# Patient Record
Sex: FEMALE | Race: WHITE | NOT HISPANIC OR LATINO | Employment: OTHER | URBAN - METROPOLITAN AREA
[De-identification: names, ages, dates, MRNs, and addresses within clinical notes are randomized per-mention and may not be internally consistent; named-entity substitution may affect disease eponyms.]

---

## 2019-12-06 ENCOUNTER — HOSPITAL ENCOUNTER (EMERGENCY)
Facility: CLINIC | Age: 79
Discharge: HOME | End: 2019-12-06
Attending: NURSE PRACTITIONER | Admitting: NURSE PRACTITIONER
Payer: MEDICARE

## 2019-12-06 VITALS
HEART RATE: 79 BPM | OXYGEN SATURATION: 97 % | WEIGHT: 132 LBS | TEMPERATURE: 98 F | RESPIRATION RATE: 18 BRPM | BODY MASS INDEX: 22.53 KG/M2 | HEIGHT: 64 IN | DIASTOLIC BLOOD PRESSURE: 74 MMHG | SYSTOLIC BLOOD PRESSURE: 113 MMHG

## 2019-12-06 DIAGNOSIS — M54.42 MIDLINE LOW BACK PAIN WITH LEFT-SIDED SCIATICA: ICD-10-CM

## 2019-12-06 PROCEDURE — 99282 EMERGENCY DEPT VISIT SF MDM: CPT | Performed by: NURSE PRACTITIONER

## 2019-12-06 PROCEDURE — 99284 EMERGENCY DEPT VISIT MOD MDM: CPT | Mod: Z6 | Performed by: NURSE PRACTITIONER

## 2019-12-06 RX ORDER — LIDOCAINE 4 G/G
1-3 PATCH TOPICAL EVERY 24 HOURS
Qty: 30 PATCH | Refills: 0 | Status: SHIPPED | OUTPATIENT
Start: 2019-12-06 | End: 2023-01-01

## 2019-12-06 RX ORDER — DOCUSATE SODIUM 100 MG/1
200 CAPSULE, LIQUID FILLED ORAL 2 TIMES DAILY PRN
Qty: 100 CAPSULE | Refills: 0 | Status: SHIPPED | OUTPATIENT
Start: 2019-12-06

## 2019-12-06 RX ORDER — METHYLPREDNISOLONE 4 MG
TABLET, DOSE PACK ORAL
Qty: 21 TABLET | Refills: 0 | Status: SHIPPED | OUTPATIENT
Start: 2019-12-06 | End: 2023-01-01

## 2019-12-06 RX ORDER — ESTRADIOL 1 MG/1
1 TABLET ORAL DAILY
COMMUNITY
Start: 2018-09-08 | End: 2023-01-01

## 2019-12-06 RX ORDER — SIMVASTATIN 20 MG
20 TABLET ORAL DAILY
COMMUNITY
Start: 2018-09-08 | End: 2021-11-19

## 2019-12-06 RX ORDER — ENALAPRIL MALEATE AND HYDROCHLOROTHIAZIDE 10; 25 MG/1; MG/1
TABLET ORAL DAILY
COMMUNITY
Start: 2018-09-08 | End: 2021-07-15

## 2019-12-06 ASSESSMENT — MIFFLIN-ST. JEOR: SCORE: 1063.75

## 2019-12-06 NOTE — ED NOTES
Patient has chronic low back pain. Has appointment with spine surgeon Wednesday.  Today pain worse.  Low back pain radiating down left buttock to posterior thigh. No numbness or tingling to left foot. No incontinence.  No recent injury.  Started 2 days ago

## 2019-12-06 NOTE — DISCHARGE INSTRUCTIONS
Start the Medrol Dosepak today and take as directed.  You should take this with food to help prevent or minimize gastrointestinal upset.  Apply 3 lidocaine patches where you are having discomfort and leave on for 12 hours.  Then you must remove and leave off for 12 hours.  You may apply 3 no lidocaine patches every single day.  If you only need 1 lidocaine patch you can do this as well.  No more than 3 lidocaine patches should be applied.  For constipation I would like you to take 2 Colace capsules by mouth twice daily until you are having daily soft regular bowel movements  Follow-up with your surgeon on Monday as previously scheduled.  Consider the above providers for primary care: Estee Wise, Estee Gallegos, or Batool Taveras.

## 2019-12-06 NOTE — ED AVS SNAPSHOT
[facility] Emergency Department  [address] [address]  Phone:  [phone]  Fax:  [phone]                                    Jordyn Miller   MRN: [id number]    Department:  [facility] Emergency Department   Date of Visit:  12/6/2019           After Visit Summary Signature Page    I have received my discharge instructions, and my questions have been answered. I have discussed any challenges I see with this plan with the nurse or doctor.    ..........................................................................................................................................  Patient/Patient Representative Signature      ..........................................................................................................................................  Patient Representative Print Name and Relationship to Patient    ..................................................               ................................................  Date                                   Time    ..........................................................................................................................................  Reviewed by Signature/Title    ...................................................              ..............................................  Date                                               Time          22EPIC Rev 08/18

## 2019-12-06 NOTE — ED PROVIDER NOTES
"  History     Chief Complaint   Patient presents with     Back Pain     acute on chronic.  low left back/buttock radiates into L groin.      HPI  Jordyn Miller is a 79 year old female who presents with acute on chronic left side low back/buttock pain.  PT states this past Monday, Tuesday or Wednesday she woke up with terrible pain that she could exercise it away.  Pt reports yesterday she could no longer \"exercise the pain away\".  Pt states the pain is located \"a little bit below the waist on the left hand side and stays close to the spine and then radiates around to the groin.\"  Pt states it feels sharp and squeezing.  Pt rates pain at best 03-4/10 and 10/10 at worst.  Pt denies pelvic girdle numbness and loss of bladder or bowel function.  PT admits to current \"plugged up\" or constipated.  Pt reports last bowel movement this am and was hard and firm.    Pt has tried exercise, biofreeze, alleve one tablet bid, and heating pad this week for pain management.    Patient denies fever, aches, chills, sweats, ear pain, eye pain, throat pain, cough, wheezing, shortness of breath, abdominal pain, nausea, vomiting, diarrhea, dysuria, speech difficulty, mental confusion, thoughts of harming self.  Patient reports feeling well otherwise    Allergies:  Allergies   Allergen Reactions     Levofloxacin Unknown       Problem List:    Patient Active Problem List    Diagnosis Date Noted     Torn earlobe 06/28/2012     Priority: Medium        Past Medical History:    No past medical history on file.  HTN  Hyperlipidemia  Osteopenia    Past Surgical History:    Past Surgical History:   Procedure Laterality Date     ENT SURGERY  6-28-12    Left earlobe repair       Family History:    No family history on file.    Social History:  Marital Status:   [4]  Social History     Tobacco Use     Smoking status: Current Every Day Smoker     Packs/day: 0.50     Types: Cigarettes     Smokeless tobacco: Never Used     Tobacco comment: " "0.5-1.0 pack per day   Substance Use Topics     Alcohol use: Yes     Comment: Occasional     Drug use: No        Medications:    docusate sodium (COLACE) 100 MG capsule  enalapril-hydrochlorothiazide (VASERETIC) 10-25 MG tablet  estradiol (ESTRACE) 1 MG tablet  Lidocaine (LIDOCARE) 4 % Patch  methylPREDNISolone (MEDROL DOSEPAK) 4 MG tablet therapy pack  simvastatin (ZOCOR) 20 MG tablet  UNKNOWN TO PATIENT      Review of Systems  As mentioned above in the history present illness. All other systems were reviewed and are negative.    Physical Exam   BP: 128/82  Pulse: 90  Temp: 98  F (36.7  C)  Resp: 18  Height: 162.6 cm (5' 4\")  Weight: 59.9 kg (132 lb)  SpO2: 97 %      Physical Exam  Vitals signs and nursing note reviewed.   Constitutional:       General: She is not in acute distress.     Appearance: She is well-developed. She is not diaphoretic.   HENT:      Head: Normocephalic and atraumatic.      Right Ear: External ear normal.      Left Ear: External ear normal.   Eyes:      General:         Right eye: No discharge.         Left eye: No discharge.      Conjunctiva/sclera: Conjunctivae normal.   Cardiovascular:      Rate and Rhythm: Regular rhythm.      Heart sounds: Normal heart sounds. No murmur. No friction rub.   Pulmonary:      Effort: Pulmonary effort is normal. No respiratory distress.      Breath sounds: Normal breath sounds. No stridor. No wheezing or rales.   Chest:      Chest wall: No tenderness.   Musculoskeletal:      Lumbar back: She exhibits tenderness and pain. She exhibits normal range of motion, no bony tenderness, no swelling, no edema, no deformity, no laceration and no spasm.        Back:    Skin:     General: Skin is warm and dry.      Coloration: Skin is not pale.      Findings: No erythema or rash.   Neurological:      Mental Status: She is alert.         ED Course        Procedures    No results found for this or any previous visit (from the past 24 hour(s)).    Medications - No data to " "display    Assessments & Plan (with Medical Decision Making)  Jordyn Miller is a 79 year old female who presents with acute on chronic left side low back/buttock pain.  PT states this past Monday, Tuesday or Wednesday she woke up with terrible pain that she could exercise it away.  Pt reports yesterday she could no longer \"exercise the pain away\".  Pt states the pain is located \"a little bit below the waist on the left hand side and stays close to the spine and then radiates around to the groin.\"  Pt states it feels sharp and squeezing.  Pt rates pain at best 03-4/10 and 10/10 at worst.  Pt denies pelvic girdle numbness and loss of bladder or bowel function.  PT admits to current \"plugged up\" or constipated.  Pt reports last bowel movement this am and was hard and firm.  Patient with back pain. No neurovascular deficits or complaints. No bowel or bladder dysfunction. No fevers or IV drug use. Normal strength, sensation and DTRs in LE bilat. No red flags and normal neuro exam so imaging is NOT indicated at this time.  Discussed with patient that she currently has a surgical appointment on Monday and recommend keep this appointment.  Will initiate a Medrol Dosepak for pain management.  Patient declines any need for narcotics.  Patient will continue her Aleve as needed.  Did discuss potential risks of steroid and NSAIDs including GI upset and gastrointestinal bleeds.  discussed at length with the patient and all questions fully answered. Return if symptoms persist or worsen, or any new troubling symptoms develop.  Also discussed with patient concern for estrogen therapy at her age and also with her smoking, cigarette usage.  Recommend primary care provider to discuss possible cessation.  Patient states she was prescribed this for treatment of osteopenia.       I have reviewed the nursing notes.    I have reviewed the findings, diagnosis, plan and need for follow up with the patient.    Discharge Medication List as " of 12/6/2019 10:50 AM      START taking these medications    Details   docusate sodium (COLACE) 100 MG capsule Take 2 capsules (200 mg) by mouth 2 times daily as needed for constipation, Disp-100 capsule, R-0, E-Prescribe      Lidocaine (LIDOCARE) 4 % Patch Place 1-3 patches onto the skin every 24 hours To prevent lidocaine toxicity, patient should be patch free for 12 hrs daily.Disp-30 patch, [id number]      methylPREDNISolone (MEDROL DOSEPAK) 4 MG tablet therapy pack Follow Package Directions, Disp-21 tablet, R-0, E-Prescribe             Final diagnoses:   Midline low back pain with left-sided sciatica       12/6/2019   [facility] EMERGENCY DEPARTMENT     Estee Quiles, APRN CNP  12/06/19 0090

## 2019-12-11 ENCOUNTER — HOSPITAL ENCOUNTER (OUTPATIENT)
Dept: PHYSICAL THERAPY | Facility: CLINIC | Age: 79
Setting detail: THERAPIES SERIES
End: 2019-12-11
Attending: ORTHOPAEDIC SURGERY
Payer: MEDICARE

## 2019-12-11 PROCEDURE — 97110 THERAPEUTIC EXERCISES: CPT | Mod: GP | Performed by: PHYSICAL THERAPIST

## 2019-12-11 PROCEDURE — 97161 PT EVAL LOW COMPLEX 20 MIN: CPT | Mod: GP | Performed by: PHYSICAL THERAPIST

## 2019-12-11 NOTE — PROGRESS NOTES
[facility]          OUTPATIENT PHYSICAL THERAPY ORTHOPEDIC EVALUATION  PLAN OF TREATMENT FOR OUTPATIENT REHABILITATION  (COMPLETE FOR INITIAL CLAIMS ONLY)  Patient's Last Name, First Name, M.I.  YOB: 1940  Jordyn Miller    Provider s Name:  [facility]   Medical Record No.  [id number]   Start of Care Date:  12/11/19   Onset Date:  12/01/19   Type:     _X__PT   ___OT   ___SLP Medical Diagnosis:  back pain, possible hamstring injury      PT Diagnosis:  low back pain   Visits from SOC:  1      _________________________________________________________________________________  Plan of Treatment/Functional Goals:  balance training, bed mobility training, gait training, joint mobilization, manual therapy, neuromuscular re-education, ROM, strengthening, stretching, transfer training           Goals  Goal Identifier: 1  Goal Description: Patient will be able to bend down to tie her shoes and stand back up with no increased pain.   Target Date: 02/05/20    Goal Identifier: 2  Goal Description: Patient will be able to return to household chores without increased low back pain.   Target Date: 02/05/20    Goal Identifier: 3  Goal Description: Patient will be able to sleep through the night without being awoken due to low back pain.   Target Date: 02/05/20           Therapy Frequency:  1 time/week  Predicted Duration of Therapy Intervention:  10 weeks    Jackeline Pino, PT                 I CERTIFY THE NEED FOR THESE SERVICES FURNISHED UNDER        THIS PLAN OF TREATMENT AND WHILE UNDER MY CARE .             Physician Signature               Date    X_____________________________________________________                             Certification Date From:  12/11/19   Certification Date To:  02/05/20    Referring Provider:  Good    Initial Assessment        See Epic Evaluation Start of Care Date: 12/11/19

## 2019-12-11 NOTE — PROGRESS NOTES
12/11/19 1100   General Information   Type of Visit Initial OP Ortho PT Evaluation   Start of Care Date 12/11/19   Referring Physician Good   Patient/Family Goals Statement get back to walking (1/2 mile)    Orders Evaluate and Treat   Date of Order 12/09/19   Certification Required? Yes   Medical Diagnosis back pain, possible hamstring injury    Surgical/Medical history reviewed Yes   Precautions/Limitations no known precautions/limitations   General Information Comments PMH: broken leg 2007, hTN, RA, smoking, stroke   Body Part(s)   Body Part(s) Lumbar Spine/SI   Presentation and Etiology   Pertinent history of current problem (include personal factors and/or comorbidities that impact the POC) On Thanksgiving, she was lifting a heavy turkey while leaning over. Ever since then she has had back pain. She went to the ER last friday due to severe pain and not being able to walk. They gave her steroids and now she can move, she wakes up stiff but the pain is still there. She has a degenerative back problems where she will have flair ups about once per year. She is planning on having an injection done today. The pain is in the bottom of the left buttock into the groin/crotch area. She has not had any numbness or tingling these past few days. She has some stabbing pain a few days ago. She does feel like the left leg wants to give out when first getting up in the morning. She can sleep for up to 4 hours.    Impairments A. Pain;F. Decreased strength and endurance;E. Decreased flexibility;H. Impaired gait;M. Locking or catching   Functional Limitations perform activities of daily living   Symptom Location left side of low back    How/Where did it occur While lifting   Onset date of current episode/exacerbation 12/01/19   Chronicity New   Pain rating (0-10 point scale) Best (/10);Worst (/10)   Best (/10) 3   Worst (/10) 9   Pain quality A. Sharp;B. Dull;C. Aching;F. Stabbing   Frequency of pain/symptoms C. With activity    Pain/symptoms are: Worse in the morning   Pain/symptoms exacerbated by B. Walking;D. Carrying;J. ADL;K. Home tasks   Pain/symptoms eased by A. Sitting;C. Rest;E. Changing positions;H. Cold;I. OTC medication(s)   Progression of symptoms since onset: Improved   Prior Level of Function   Prior Level of Function-Mobility indp   Prior Level of Function-ADLs indp    Functional Level Prior Comment indp    Current Level of Function   Current Community Support Family/friend caregiver   Patient role/employment history F. Retired   [facility]   Home/community accessibility no cocerns   Current equipment-Gait/Locomotion None   Current equipment-ADL None   Fall Risk Screen   Fall screen completed by PT   Have you fallen 2 or more times in the past year? No   Have you fallen and had an injury in the past year? No   Is patient a fall risk? No   Abuse Screen (yes response referral indicated)   Feels Unsafe at Home or Work/School no   Feels Threatened by Someone no   Does Anyone Try to Keep You From Having Contact with Others or Doing Things Outside Your Home? no   Physical Signs of Abuse Present no   System Outcome Measures   Outcome Measures Low Back Pain (see Oswestry and Elizabeth)   Lumbar Spine/SI Objective Findings   Integumentary no concern   Posture increased thoracic kypohosis and forward head poture   Gait/Locomotion trendelenburg to the left, painful with putting weight on the left foot during stance phase    Flexion ROM finger tips to floor- no pain while going down, using hands to stand back up    Extension ROM 75% limited - decreased pain   Right Side Bending ROM finger tips 2 inches above joint line- no increase in pain    Left Side Bending ROM finger tips 2 inches above joint line- no increase in pain    Repeated Extension-Standing ROM increased pain but pain does not move    Repeated Flexion-Standing ROM increased pain - but pain did not move   Hip Screen  WNL with hip flexion, hip IR/ER, hip  extension on the left painful past neutral    Transversus Abdominus Strength (Connor Leg Lowering-deg) able to elicit contraction    Hip Flexion (L2) Strength 4/5 B    Hip Abduction Strength 4-/5 B  (painful on the left)    Hip Adduction Strength 4+/5    Hip Extension Strength 4+/5 B    Knee Flexion Strength 4+/5 B no pain    Knee Extension (L3) Strength 4+/5 B    Ankle Dorsiflexion (L4) Strength 4+/5 on left, 4/5 right   Great Toe Extension (L5) Strength 5/5 B    Ankle Plantar Flexion (S1) Strength 5/5 B    Hamstring Flexibility WNL B    Hip Flexor Flexibility WNL B   Quadricep Flexibility WNL B    SLR negative B    Elier Test negative B but painful on the left into the buttock    Ely Test negative B but bad quad cramping    Slump Test negative B    Spring Test painful with spring testing over L1-5   Segmental Mobility hypomobility throughout L1-5    Sensation Testing numbness noted over L4 dermatome on the left    Patellar Tendon Reflexes  1+ B    Achilles Tendon Reflexes not able to elicit    Palpation very tender with increased tone around glut min and piriforis on the left, non tender over iscial tuberosity on L    Planned Therapy Interventions   Planned Therapy Interventions balance training;bed mobility training;gait training;joint mobilization;manual therapy;neuromuscular re-education;ROM;strengthening;stretching;transfer training   Clinical Impression   Criteria for Skilled Therapeutic Interventions Met yes, treatment indicated   PT Diagnosis low back pain   Influenced by the following impairments decreased trunk ROM, decreased core and hip strength, pain with returning to standing from flexion   Functional limitations due to impairments standing, walking, sleping, transfers, lifting    Clinical Presentation Stable/Uncomplicated   Clinical Presentation Rationale clinical decision making and chart review   Clinical Decision Making (Complexity) Low complexity   Therapy Frequency 1 time/week   Predicted  Duration of Therapy Intervention (days/wks) 10 weeks   Risk & Benefits of therapy have been explained Yes   Patient, Family & other staff in agreement with plan of care Yes   Clinical Impression Comments Patient is a 79 year old female who presents with low back pain that started after lifting a turkey. Patient getting an injection today after PT apt.    Education Assessment   Preferred Learning Style Listening;Demonstration   Barriers to Learning No barriers   ORTHO GOALS   PT Ortho Eval Goals 1;2;3   Ortho Goal 1   Goal Identifier 1   Goal Description Patient will be able to bend down to tie her shoes and stand back up with no increased pain.    Target Date 02/05/20   Ortho Goal 2   Goal Identifier 2   Goal Description Patient will be able to return to household chores without increased low back pain.    Target Date 02/05/20   Ortho Goal 3   Goal Identifier 3   Goal Description Patient will be able to sleep through the night without being awoken due to low back pain.    Target Date 02/05/20   Total Evaluation Time   PT Eval, Low Complexity Minutes (08274) 30   Therapy Certification   Certification date from 12/11/19   Certification date to 02/05/20   Medical Diagnosis back pain, possible hamstring injury

## 2020-02-12 NOTE — PROGRESS NOTES
Physical Therapy Discharge Summary    Jordyn Miller has completed the ordered physical therapy evaluation. Treatment recommendations were made, but pt has not returned for any further recommended PT sessions.  Pt was unable to be re-assessed, and present status is unknown.    Please contact me with any questions or concerns.  Thank you for your referral.      Jackeline Pino  PT, DPT       2/12/2020   [address] [address]  [email or web address]  Voicemail: [phone]

## 2020-10-08 ENCOUNTER — COMMUNICATION - HEALTHEAST (OUTPATIENT)
Dept: SCHEDULING | Facility: CLINIC | Age: 80
End: 2020-10-08

## 2020-11-06 ENCOUNTER — OFFICE VISIT - HEALTHEAST (OUTPATIENT)
Dept: FAMILY MEDICINE | Facility: CLINIC | Age: 80
End: 2020-11-06

## 2020-11-06 DIAGNOSIS — Z23 INFLUENZA VACCINE NEEDED: ICD-10-CM

## 2020-11-06 DIAGNOSIS — Z23 IMMUNIZATION DUE: ICD-10-CM

## 2020-11-06 DIAGNOSIS — I10 BENIGN ESSENTIAL HYPERTENSION: ICD-10-CM

## 2020-11-06 DIAGNOSIS — Z78.0 POSTMENOPAUSAL STATUS: ICD-10-CM

## 2020-11-06 DIAGNOSIS — E78.5 HYPERLIPIDEMIA LDL GOAL <130: ICD-10-CM

## 2020-11-06 DIAGNOSIS — Z00.00 ROUTINE GENERAL MEDICAL EXAMINATION AT A HEALTH CARE FACILITY: ICD-10-CM

## 2020-11-06 LAB
ANION GAP SERPL CALCULATED.3IONS-SCNC: 12 MMOL/L (ref 5–18)
BUN SERPL-MCNC: 19 MG/DL (ref 8–28)
CALCIUM SERPL-MCNC: 9.4 MG/DL (ref 8.5–10.5)
CHLORIDE BLD-SCNC: 103 MMOL/L (ref 98–107)
CHOLEST SERPL-MCNC: 178 MG/DL
CO2 SERPL-SCNC: 24 MMOL/L (ref 22–31)
CREAT SERPL-MCNC: 0.78 MG/DL (ref 0.6–1.1)
FASTING STATUS PATIENT QL REPORTED: NO
GFR SERPL CREATININE-BSD FRML MDRD: >60 ML/MIN/1.73M2
GLUCOSE BLD-MCNC: 94 MG/DL (ref 70–125)
HDLC SERPL-MCNC: 90 MG/DL
LDLC SERPL CALC-MCNC: 48 MG/DL
POTASSIUM BLD-SCNC: 4.1 MMOL/L (ref 3.5–5)
SODIUM SERPL-SCNC: 139 MMOL/L (ref 136–145)
TRIGL SERPL-MCNC: 201 MG/DL

## 2020-11-06 ASSESSMENT — MIFFLIN-ST. JEOR: SCORE: 1032.52

## 2020-11-08 ENCOUNTER — COMMUNICATION - HEALTHEAST (OUTPATIENT)
Dept: FAMILY MEDICINE | Facility: CLINIC | Age: 80
End: 2020-11-08

## 2020-11-16 ENCOUNTER — AMBULATORY - HEALTHEAST (OUTPATIENT)
Dept: SCHEDULING | Facility: CLINIC | Age: 80
End: 2020-11-16

## 2020-11-16 DIAGNOSIS — Z78.0 POSTMENOPAUSAL STATUS: ICD-10-CM

## 2020-12-02 ENCOUNTER — APPOINTMENT (OUTPATIENT)
Dept: CT IMAGING | Facility: CLINIC | Age: 80
End: 2020-12-02
Attending: EMERGENCY MEDICINE
Payer: MEDICARE

## 2020-12-02 ENCOUNTER — HOSPITAL ENCOUNTER (EMERGENCY)
Facility: CLINIC | Age: 80
Discharge: HOME | End: 2020-12-02
Attending: EMERGENCY MEDICINE | Admitting: EMERGENCY MEDICINE
Payer: MEDICARE

## 2020-12-02 VITALS
BODY MASS INDEX: 24.29 KG/M2 | WEIGHT: 132 LBS | TEMPERATURE: 97 F | OXYGEN SATURATION: 98 % | DIASTOLIC BLOOD PRESSURE: 110 MMHG | HEART RATE: 93 BPM | HEIGHT: 62 IN | RESPIRATION RATE: 18 BRPM | SYSTOLIC BLOOD PRESSURE: 143 MMHG

## 2020-12-02 DIAGNOSIS — S16.1XXA CERVICAL STRAIN, INITIAL ENCOUNTER: ICD-10-CM

## 2020-12-02 DIAGNOSIS — S06.0X0A CLOSED HEAD INJURY WITH CONCUSSION, WITHOUT LOSS OF CONSCIOUSNESS, INITIAL ENCOUNTER: ICD-10-CM

## 2020-12-02 DIAGNOSIS — R55 VASOVAGAL SYNCOPE: ICD-10-CM

## 2020-12-02 LAB
ANION GAP SERPL CALCULATED.3IONS-SCNC: 5 MMOL/L (ref 3–14)
BASOPHILS # BLD AUTO: 0 10E9/L (ref 0–0.2)
BASOPHILS NFR BLD AUTO: 0.3 %
BUN SERPL-MCNC: 22 MG/DL (ref 7–30)
CALCIUM SERPL-MCNC: 9.3 MG/DL (ref 8.5–10.1)
CHLORIDE SERPL-SCNC: 102 MMOL/L (ref 94–109)
CO2 SERPL-SCNC: 30 MMOL/L (ref 20–32)
CREAT SERPL-MCNC: 0.85 MG/DL (ref 0.52–1.04)
DIFFERENTIAL METHOD BLD: ABNORMAL
EOSINOPHIL # BLD AUTO: 0.1 10E9/L (ref 0–0.7)
EOSINOPHIL NFR BLD AUTO: 0.8 %
ERYTHROCYTE [DISTWIDTH] IN BLOOD BY AUTOMATED COUNT: 13.4 % (ref 10–15)
GFR SERPL CREATININE-BSD FRML MDRD: 65 ML/MIN/{1.73_M2}
GLUCOSE SERPL-MCNC: 106 MG/DL (ref 70–99)
HCT VFR BLD AUTO: 45.7 % (ref 35–47)
HGB BLD-MCNC: 16 G/DL (ref 11.7–15.7)
IMM GRANULOCYTES # BLD: 0 10E9/L (ref 0–0.4)
IMM GRANULOCYTES NFR BLD: 0.5 %
LYMPHOCYTES # BLD AUTO: 1 10E9/L (ref 0.8–5.3)
LYMPHOCYTES NFR BLD AUTO: 13.2 %
MCH RBC QN AUTO: 33.3 PG (ref 26.5–33)
MCHC RBC AUTO-ENTMCNC: 35 G/DL (ref 31.5–36.5)
MCV RBC AUTO: 95 FL (ref 78–100)
MONOCYTES # BLD AUTO: 0.6 10E9/L (ref 0–1.3)
MONOCYTES NFR BLD AUTO: 8.2 %
NEUTROPHILS # BLD AUTO: 5.6 10E9/L (ref 1.6–8.3)
NEUTROPHILS NFR BLD AUTO: 77 %
NRBC # BLD AUTO: 0 10*3/UL
NRBC BLD AUTO-RTO: 0 /100
PLATELET # BLD AUTO: 185 10E9/L (ref 150–450)
POTASSIUM SERPL-SCNC: 4.5 MMOL/L (ref 3.4–5.3)
RBC # BLD AUTO: 4.81 10E12/L (ref 3.8–5.2)
SODIUM SERPL-SCNC: 137 MMOL/L (ref 133–144)
WBC # BLD AUTO: 7.3 10E9/L (ref 4–11)

## 2020-12-02 PROCEDURE — 80048 BASIC METABOLIC PNL TOTAL CA: CPT | Performed by: EMERGENCY MEDICINE

## 2020-12-02 PROCEDURE — 99285 EMERGENCY DEPT VISIT HI MDM: CPT | Mod: 25 | Performed by: EMERGENCY MEDICINE

## 2020-12-02 PROCEDURE — 93005 ELECTROCARDIOGRAM TRACING: CPT | Performed by: EMERGENCY MEDICINE

## 2020-12-02 PROCEDURE — 70450 CT HEAD/BRAIN W/O DYE: CPT

## 2020-12-02 PROCEDURE — 72125 CT NECK SPINE W/O DYE: CPT

## 2020-12-02 PROCEDURE — 93010 ELECTROCARDIOGRAM REPORT: CPT | Performed by: EMERGENCY MEDICINE

## 2020-12-02 PROCEDURE — 85025 COMPLETE CBC W/AUTO DIFF WBC: CPT | Performed by: EMERGENCY MEDICINE

## 2020-12-02 ASSESSMENT — MIFFLIN-ST. JEOR: SCORE: 1027

## 2020-12-02 NOTE — ED AVS SNAPSHOT
[facility] Emergency Dept  [address] [address]  Phone: [phone]  Fax: [phone]                                    Jordyn Miller   MRN: [id number]    Department: [facility] Emergency Dept   Date of Visit: 12/2/2020           After Visit Summary Signature Page    I have received my discharge instructions, and my questions have been answered. I have discussed any challenges I see with this plan with the nurse or doctor.    ..........................................................................................................................................  Patient/Patient Representative Signature      ..........................................................................................................................................  Patient Representative Print Name and Relationship to Patient    ..................................................               ................................................  Date                                   Time    ..........................................................................................................................................  Reviewed by Signature/Title    ...................................................              ..............................................  Date                                               Time          22EPIC Rev 08/18

## 2020-12-02 NOTE — ED PROVIDER NOTES
History     Chief Complaint   Patient presents with     Fall     pt fell from standing position on Thursday night, hitting head.  pt not on blood thinner. hx of blood clotting disease.   pt has been dizzy, headache, and nausated since the fall.      HPI  Jordyn Miller is a 80 year old female who who presents 6 days after a fall from standing position with closed head injury without LOC with persistent headache, lightheadedness/dizziness and nausea. She also has poorly localized posterior neck pain, primarily on the right.  No neurologic deficit or abnormality.  No other injury or trauma.  No anticoagulation therapy.  She reports she fell after fainting when she got up quickly off of the couch from taking a nap.  She had no preceding chest pain, palpitations or shortness of breath.  No prior history of fainting or syncope.  She denies signs or symptoms of PE/DVT or GI bleeding.  No other acute complaints or concerns.    Previous Records in Care Everywhere were Reviewed:  Allergies    Active Allergy Reactions Severity Noted Date Comments   Levofloxacin *Unknown - Pt Doesn't Remember   11/30/2018       Medications    Medication Sig Dispensed Refills Start Date End Date Status   enalapril-hydrochlorothiazide, 10-25 mg, (VASERETIC) 10-25 mg tablet       3 09/08/2018   Active   estradiol (ESTRACE) 1 mg tablet       4 09/08/2018   Active   simvastatin (ZOCOR) 20 mg tablet       3 09/08/2018   Active   tobramycin (TOBREX) 0.3 % ophthalmic solution   Place 1 Drop into right eye 4 times daily. 5 mL   0 12/04/2018   Active   prednisoLONE acetate 1% ophthalmic (ECONOPRED PLUS, PRED FORTE, OMNIPRED) suspension   Place 1 Drop into the eye(s) 4 times daily. 15 mL   1 01/16/2019   Active     Active Problems    Not on file      Surgical History    Surgery Date Site/Laterality Comments   CATARACT EXTRACTION W/ INTRAOCULAR LENS IMPLANT 12/04/2018 Right      HYSTERECTOMY          LASIK          RIGHT VITRECTOMY POSTERIOR 25 GAUGE  "SYSTEM, LENSECTOMY, AIR BUBBLE 01/16/2019 Right Dr. Benton       Medical History    Medical History Date Comments   Hypertension       Hypercholesteremia       Cataracts, bilateral         Social History    Tobacco Use Types Packs/Day Years Used Date   Current Every Day Smoker Cigarettes 0.33 46     Smokeless Tobacco: Never Used           Comments: SO also smokes     Alcohol Use Drinks/Week oz/Week Comments   Yes     0-1 drink/day       Allergies:  Allergies   Allergen Reactions     Levofloxacin Unknown       Problem List:    Patient Active Problem List    Diagnosis Date Noted     Torn earlobe 06/28/2012     Priority: Medium        Past Medical History:    No past medical history on file.    Past Surgical History:    Past Surgical History:   Procedure Laterality Date     ENT SURGERY  6-28-12    Left earlobe repair       Family History:    No family history on file.    Social History:  Marital Status:   [4]  Social History     Tobacco Use     Smoking status: Current Every Day Smoker     Packs/day: 0.50     Types: Cigarettes     Smokeless tobacco: Never Used     Tobacco comment: 0.5-1.0 pack per day   Substance Use Topics     Alcohol use: Yes     Comment: Occasional     Drug use: No        Medications:         docusate sodium (COLACE) 100 MG capsule       enalapril-hydrochlorothiazide (VASERETIC) 10-25 MG tablet       estradiol (ESTRACE) 1 MG tablet       Lidocaine (LIDOCARE) 4 % Patch       methylPREDNISolone (MEDROL DOSEPAK) 4 MG tablet therapy pack       simvastatin (ZOCOR) 20 MG tablet       UNKNOWN TO PATIENT        Review of Systems  As mentioned above in the history present illness.  All other systems were reviewed and are negative.    Physical Exam   BP: 125/78  Pulse: 99  Temp: 97  F (36.1  C)  Resp: 18  Height: 157.5 cm (5' 2\")  Weight: 59.9 kg (132 lb)  SpO2: 98 %  Lying Orthostatic BP: 131/89  Lying Orthostatic Pulse: 103 bpm  Standing Orthostatic BP: 138/79  Standing Orthostatic Pulse: 88 " bpm      Physical Exam  Vitals signs and nursing note reviewed.   Constitutional:       General: She is not in acute distress.     Appearance: Normal appearance. She is well-developed. She is not ill-appearing or diaphoretic.   HENT:      Head: Normocephalic and atraumatic. No raccoon eyes, Sharp's sign, abrasion or contusion.      Right Ear: Tympanic membrane, ear canal and external ear normal. No drainage. No hemotympanum.      Left Ear: Tympanic membrane, ear canal and external ear normal. No drainage. No hemotympanum.      Nose: Nose normal. No rhinorrhea.   Eyes:      General: No scleral icterus.     Extraocular Movements: Extraocular movements intact.      Conjunctiva/sclera: Conjunctivae normal.      Pupils: Pupils are equal, round, and reactive to light.   Neck:      Musculoskeletal: Normal range of motion and neck supple. Normal range of motion ( Full active range of motion without apparent discomfort). Muscular tenderness present. No edema or crepitus.      Trachea: No tracheal deviation.     Cardiovascular:      Rate and Rhythm: Normal rate and regular rhythm.      Heart sounds: Normal heart sounds. No murmur. No friction rub. No gallop.    Pulmonary:      Effort: Pulmonary effort is normal. No respiratory distress.      Breath sounds: Normal breath sounds. No wheezing, rhonchi or rales.   Musculoskeletal: Normal range of motion.         General: No swelling, tenderness or signs of injury.      Right lower leg: No edema.      Left lower leg: No edema.   Skin:     General: Skin is warm and dry.      Coloration: Skin is not pale.      Findings: No erythema or rash.   Neurological:      General: No focal deficit present.      Mental Status: She is alert and oriented to person, place, and time.      GCS: GCS eye subscore is 4. GCS verbal subscore is 5. GCS motor subscore is 6.      Cranial Nerves: Cranial nerves are intact. No cranial nerve deficit ( 2-12 intact).      Sensory: Sensation is intact.       Motor: Motor function is intact.      Coordination: Coordination is intact. Coordination normal.      Gait: Gait is intact.   Psychiatric:         Mood and Affect: Mood normal.         Behavior: Behavior normal.         ED Course        Procedures                EKG Interpretation:      Interpreted by Adriel Staton MD  Time reviewed: upon completion  Symptoms at time of EKG: None, status post syncopal episode  Rhythm: Normal sinus   Rate: Normal  Axis: Normal  Ectopy: None  Conduction: Normal  ST Segments/ T Waves: No ST-T wave changes. No acute ischemic changes  Q Waves: None  Comparison to prior: No old EKG available  Clinical Impression: normal EKG         Results for orders placed or performed during the hospital encounter of 12/02/20 (from the past 24 hour(s))   CT Head w/o Contrast    Narrative    CT SCAN OF THE HEAD WITHOUT CONTRAST   12/2/2020 11:41 AM     HISTORY: Elderly patient with fall and closed head 6 days ago with  persistent headache and nausea.    TECHNIQUE: Axial images of the head and coronal reformations without  IV contrast material. Radiation dose for this scan was reduced using  automated exposure control, adjustment of the mA and/or kV according  to patient size, or iterative reconstruction technique.    COMPARISON: None.    FINDINGS: No evidence of acute intracranial hemorrhage. No mass effect  or midline shift. More chronic appearing well-defined area of  encephalomalacia in the right parietal/temporal region with ex vacuo  dilatation of the right lateral ventricle. Moderate diffuse  parenchymal volume loss. Patchy periventricular white matter  hypodensities are nonspecific, but likely related to chronic  microvascular ischemic disease. No abnormal extra axial fluid  collection.    The visualized portions of the sinuses and mastoids appear normal. The  bony calvarium and bones of the skull base appear intact.       Impression    IMPRESSION:     1. No evidence of acute intracranial  hemorrhage, mass, or herniation.  2. Chronic appearing area of encephalomalacia in the posterior right  cerebral hemisphere.  3. Moderate diffuse parenchymal volume loss and white matter changes  likely due to chronic microvascular ischemic disease.      ANIKA BOLANOS MD   CT Cervical Spine w/o Contrast    Narrative    CT CERVICAL SPINE WITHOUT CONTRAST   12/2/2020 11:43 AM     HISTORY: Fall with closed head injury 6 days ago with posterior neck  pain.     TECHNIQUE: Axial images of the cervical spine were obtained without  intravenous contrast. Multiplanar reformations were performed.  Radiation dose for this scan was reduced using automated exposure  control, adjustment of the mA and/or kV according to patient size, or  iterative reconstruction technique.    COMPARISON: None.    FINDINGS: No evidence of fracture. No prevertebral soft tissue  swelling. Straightening of the normal cervical lordosis. Vertebral  body height is maintained. No destructive osseous lesions. Severe  degenerative endplate changes and loss of disc height in the cervical  spine particularly at C4-5, C5-6, and C6-7. Mild spinal canal  narrowings at C4-5, C5-6, and C6-7 due to disc osteophytes. Multiple  levels of neural foraminal narrowing throughout the cervical spine,  particularly at C4-5, C5-6, and C6-7.     Visualized paraspinous tissues: Unremarkable.      Impression    IMPRESSION:   1. No evidence of acute fracture or subluxation in the cervical spine.  2. Degenerative changes in the cervical spine as described above.      ANIKA BOLANOS MD   CBC with platelets, differential   Result Value Ref Range    WBC 7.3 4.0 - 11.0 10e9/L    RBC Count 4.81 3.8 - 5.2 10e12/L    Hemoglobin 16.0 (H) 11.7 - 15.7 g/dL    Hematocrit 45.7 35.0 - 47.0 %    MCV 95 78 - 100 fl    MCH 33.3 (H) 26.5 - 33.0 pg    MCHC 35.0 31.5 - 36.5 g/dL    RDW 13.4 10.0 - 15.0 %    Platelet Count 185 150 - 450 10e9/L    Diff Method Automated Method     % Neutrophils 77.0 %     % Lymphocytes 13.2 %    % Monocytes 8.2 %    % Eosinophils 0.8 %    % Basophils 0.3 %    % Immature Granulocytes 0.5 %    Nucleated RBCs 0 0 /100    Absolute Neutrophil 5.6 1.6 - 8.3 10e9/L    Absolute Lymphocytes 1.0 0.8 - 5.3 10e9/L    Absolute Monocytes 0.6 0.0 - 1.3 10e9/L    Absolute Eosinophils 0.1 0.0 - 0.7 10e9/L    Absolute Basophils 0.0 0.0 - 0.2 10e9/L    Abs Immature Granulocytes 0.0 0 - 0.4 10e9/L    Absolute Nucleated RBC 0.0    Basic metabolic panel   Result Value Ref Range    Sodium 137 133 - 144 mmol/L    Potassium 4.5 3.4 - 5.3 mmol/L    Chloride 102 94 - 109 mmol/L    Carbon Dioxide 30 20 - 32 mmol/L    Anion Gap 5 3 - 14 mmol/L    Glucose 106 (H) 70 - 99 mg/dL    Urea Nitrogen 22 7 - 30 mg/dL    Creatinine 0.85 0.52 - 1.04 mg/dL    GFR Estimate 65 >60 mL/min/[1.73_m2]    GFR Estimate If Black 75 >60 mL/min/[1.73_m2]    Calcium 9.3 8.5 - 10.1 mg/dL       Medications - No data to display    Assessments & Plan (with Medical Decision Making)   80-year-old female with history consistent with a vasovagal episode resulting in fall with closed head injury 6 days ago presents at the urging of her family due to persistent headache, lightheadedness mild nausea and posterior neck pain.  No anticoagulation therapy.  Neurologically intact.  A CT scan of the head and cervical spine showed no acute traumatic abnormality or acute disease process.  EKG and laboratory evaluation were unremarkable.  Doubt her syncope was secondary to atypical ACS or dysrhythmia, PE/DVT or other emergent disease process.  Do not feel that MRI of the cervical spine is currently indicated and I I recommended she follow-up in primary care clinic next week if any pain persists.  I recommended she push fluids and writing her precautions regarding vasovagal syncope.  She will return as needed for current syncope or worsening symptoms or new problems or concerns.    I have reviewed the nursing notes.    I have reviewed the findings,  diagnosis, plan and need for follow up with the patient.    Discharge Medication List as of 12/2/2020  1:33 PM   Ibuprofen 400 mg po q 6 hrs prn (OTC)         Final diagnoses:   Closed head injury with concussion, without loss of consciousness, initial encounter   Cervical strain, initial encounter   Vasovagal syncope       12/2/2020   [facility] EMERGENCY DEPT     Adriel Staton MD  12/02/20 1936

## 2020-12-02 NOTE — ED NOTES
Patient fell Thursday, fell asleep on couch, needed to use restroom. Remembers standing up but does not recall the fall. Her partner heard her fall, found her almost immediately after, awake. Right posterior head/neck pain. Bruising to right elbow. Not blood thinners. HA since fall, tender to touch posterior head.

## 2021-01-13 ENCOUNTER — COMMUNICATION - HEALTHEAST (OUTPATIENT)
Dept: FAMILY MEDICINE | Facility: CLINIC | Age: 81
End: 2021-01-13

## 2021-01-31 ENCOUNTER — HEALTH MAINTENANCE LETTER (OUTPATIENT)
Age: 81
End: 2021-01-31

## 2021-03-08 ENCOUNTER — MYC MEDICAL ADVICE (OUTPATIENT)
Dept: FAMILY MEDICINE | Facility: CLINIC | Age: 81
End: 2021-03-08

## 2021-06-05 VITALS
WEIGHT: 132.5 LBS | HEART RATE: 90 BPM | SYSTOLIC BLOOD PRESSURE: 142 MMHG | BODY MASS INDEX: 24.38 KG/M2 | TEMPERATURE: 95.6 F | RESPIRATION RATE: 20 BRPM | DIASTOLIC BLOOD PRESSURE: 81 MMHG | HEIGHT: 62 IN

## 2021-06-12 NOTE — PATIENT INSTRUCTIONS - HE
Advance Directive  Patient s advance directive was discussed and I am comfortable with the patient s wishes.  Patient Education   Personalized Prevention Plan  You are due for the preventive services outlined below.  Your care team is available to assist you in scheduling these services.  If you have already completed any of these items, please share that information with your care team to update in your medical record.  Health Maintenance   Topic Date Due     HEPATITIS C SCREENING  1940     TD 18+ HE  10/16/1959     ADVANCE CARE PLANNING  10/16/1959     LIPID  10/16/1986     Pneumococcal Vaccine: 65+ Years (1 of 1 - PPSV23) 10/16/2006     DXA SCAN  10/16/2006     ZOSTER VACCINES (2 of 3) 05/10/2016     INFLUENZA VACCINE RULE BASED (1) 08/01/2020     MEDICARE ANNUAL WELLNESS VISIT  11/06/2021     FALL RISK ASSESSMENT  11/06/2021     Pneumococcal Vaccine: Pediatrics (0 to 5 Years) and At-Risk Patients (6 to 64 Years)  Aged Out     HEPATITIS B VACCINES  Aged Out

## 2021-06-12 NOTE — TELEPHONE ENCOUNTER
New Appointment Needed  What is the reason for the visit:    New patient Physicals  Provider Preference: Dr Ercia Phillips  How soon do you need to be seen?:  As available in November, patient advised that Dr Phillips currently does not have schedule and [place] [facility] is closing. Please advise patient if able to schedule with Dr Phillips at another clinic at this time. Patient very upset at hold times and did not want to ask patient to call back.   Waitlist offered?: No  Okay to leave a detailed message:  Yes

## 2021-06-12 NOTE — PROGRESS NOTES
Assessment and Plan:   Jordyn is a 80-year-old female presenting for an annual wellness visit.    Patient has been advised of split billing requirements and indicates understanding: Yes  1. Routine general medical examination at a health care facility  Overall doing well.    2. Benign essential hypertension  Blood pressure at goal.  BMP ordered.  Does not need refills currently.  - Basic Metabolic Panel    3. Hyperlipidemia LDL goal <130  - Lipid Cascade RANDOM    4. Postmenopausal status  I have encouraged her to continue weaning off of the Estrace.  Bone density scan will be ordered.  Patient could consider Fosamax for bone density support.  If bone density is within a good range would recommend repeat in 1 to 2 years after being off of Estrace.  - DXA Bone Density Scan; Future    5. Influenza vaccine needed  - Influenza,Quad,High Dose,PF 65 YR+    6. Immunization due  - Pneumococcal conjugate vaccine 13-valent 6wks-17yrs; >50yrs     The patient's current medical problems were reviewed.    The following health maintenance schedule was reviewed with the patient and provided in printed form in the after visit summary:   Health Maintenance   Topic Date Due     HEPATITIS C SCREENING  1940     TD 18+ HE  10/16/1959     DXA SCAN  10/16/2006     ZOSTER VACCINES (2 of 3) 05/10/2016     MEDICARE ANNUAL WELLNESS VISIT  11/06/2021     Pneumococcal Vaccine: 65+ Years (2 of 2 - PPSV23) 11/06/2021     FALL RISK ASSESSMENT  11/06/2021     LIPID  11/06/2025     ADVANCE CARE PLANNING  11/06/2025     INFLUENZA VACCINE RULE BASED  Completed     Pneumococcal Vaccine: Pediatrics (0 to 5 Years) and At-Risk Patients (6 to 64 Years)  Aged Out     HEPATITIS B VACCINES  Aged Out        Subjective:   Chief Complaint: Jordyn Miller is an 80 y.o. female here for an Annual Wellness visit.   HPI:  Jordyn gr is a new patient to our clinic today.    She is overall very healthy.  She has a past medical history significant for  "hypertension for which she takes enalapril/hydrochlorothiazide.  She denies any chest pain or shortness of breath.  She also takes simvastatin.    She unfortunately has had some low back issues.  She has had a few injections which have been somewhat official for her.  She is following with a back specialist.    The patient currently takes Estrace 1 mg every other day.  She is taking this for \"my back.\"  She does not have hot flashes.    Review of Systems:   Please see above.  The rest of the review of systems are negative for all systems.    Patient Care Team:  Erica Phillips MD as PCP - General (Family Medicine)     Patient Active Problem List   Diagnosis     Benign essential hypertension     Hyperlipidemia LDL goal <130     Past Medical History:   Diagnosis Date     Hypertension      Low back pain     Dr Funk in [facility]      No past surgical history on file.   No family history on file.   Social History     Socioeconomic History     Marital status: Single     Spouse name: Not on file     Number of children: Not on file     Years of education: Not on file     Highest education level: Not on file   Occupational History     Not on file   Social Needs     Financial resource strain: Not on file     Food insecurity     Worry: Not on file     Inability: Not on file     Transportation needs     Medical: Not on file     Non-medical: Not on file   Tobacco Use     Smoking status: Current Every Day Smoker     Packs/day: 0.50     Types: Cigarettes     Smokeless tobacco: Never Used   Substance and Sexual Activity     Alcohol use: Not on file     Drug use: Not on file     Sexual activity: Not on file   Lifestyle     Physical activity     Days per week: Not on file     Minutes per session: Not on file     Stress: Not on file   Relationships     Social connections     Talks on phone: Not on file     Gets together: Not on file     Attends [employer] service: Not on file     Active member of club or organization: " "Not on file     Attends meetings of clubs or organizations: Not on file     Relationship status: Not on file     Intimate partner violence     Fear of current or ex partner: Not on file     Emotionally abused: Not on file     Physically abused: Not on file     Forced sexual activity: Not on file   Other Topics Concern     Not on file   Social History Narrative     Not on file      Current Outpatient Medications   Medication Sig Dispense Refill     enalapriL-hydrochlorothiazide (VASERETIC) 10-25 mg per tablet Take 2 tablets by mouth daily.       estradioL (ESTRACE) 1 MG tablet Take 1 mg by mouth daily.       simvastatin (ZOCOR) 20 MG tablet Take 20 mg by mouth daily.       No current facility-administered medications for this visit.       Objective:   Vital Signs:   Visit Vitals  /81   Pulse 90   Temp (!) 95.6  F (35.3  C) (Oral)   Resp 20   Ht 5' 2.21\" (1.58 m)   Wt 132 lb 8 oz (60.1 kg)   Breastfeeding No   BMI 24.08 kg/m           VisionScreening:  No exam data present     PHYSICAL EXAM    Physical Exam:  General Appearance: Alert, cooperative, no distress, appears stated age   Head: Normocephalic, without obvious abnormality, atraumatic  Eyes: PERRL, conjunctiva/corneas clear, EOM's intact   Ears: Normal TM's and external ear canals, both ears  Nose:Nares normal, septum midline,mucosa normal, no drainage    Throat:Lips, mucosa, and tongue normal; teeth and gums normal  Neck: Supple, symmetrical, trachea midline, no adenopathy;  thyroid: not enlarged, symmetric, no tenderness/mass/nodules  Back: Symmetric, no curvature, ROM normal,  Lungs: Clear to auscultation bilaterally, respirations unlabored  Heart: Regular rate and rhythm, S1 and S2 normal, no murmur, rub, or gallop  Abdomen: Soft, non-tender, bowel sounds active all four quadrants,  no masses, no organomegaly  Extremities: Extremities normal, atraumatic, no cyanosis or edema  Skin: Skin color, texture, turgor normal, no rashes or lesions  Lymph nodes: " Cervical, supraclavicular, and axillary nodes normal and   Neurologic: Normal            Assessment Results 11/6/2020   Activities of Daily Living No help needed   Instrumental Activities of Daily Living No help needed   Mini Cog Total Score 3     A Mini-Cog score of 0-2 suggests the possibility of dementia, score of 3-5 suggests no dementia      Identified Health Risks:     Patient's advanced directive was discussed and I am comfortable with the patient's wishes.

## 2021-06-21 NOTE — LETTER
Letter by Erica Phillips MD at      Author: Erica Phillips MD Service: -- Author Type: --    Filed:  Encounter Date: 1/13/2021 Status: (Other)         Jordyn Miller  [id number]  [facility] [address]             January 13, 2021         Dear Ms. Miller,    Below are the results from your recent visit:    Resulted Orders   DXA Bone Density Scan    Narrative    1/11/2021      RE: Jordyn Miller  YOB: 1940        Dear Erica Phillips,    Patient Profile:  80 y.o. female, postmenopausal, is here for the first bone density test.   History of fractures - Lower leg. Family history of osteoporosis - Yes;    mother.  Family history of hip fracture: None. Smoking history - Current.   Osteoporosis treatment past -  Yes;  HRT. Osteoporosis treatment current -   No.  Chronic medical problems - Chronic low back problems and   Hysterectomy. High risk medications -  None.      Assessment:    1. The spine bone density L1-L4 with T-score -1.0.  2. Femoral bone densities show left femoral neck T- score -1.2 and right   femoral neck T-score -0.8.  3. Trabecular bone score indicates moderate trabecular bone architecture.      80 y.o. female with LOW BONE DENSITY (OSTEOPENIA) and HIGH fracture risk,   adjusted for the TBS, with major osteoporotic fracture risk 12.1% and hip   fracture risk 4.2%.  According to the World Health Organization, a hip   fracture risk greater than 3.0% can be an indication for evaluation and   treatment of low bone mass.    There are no previous bone densities for comparison.    Recommendations:  Appropriate calcium, vitamin D supplements, along with balance and weight   bearing exercise recommended.  Additionally, the consideration for   evaluation and treatment of low bone mass with elevated hip fracture risk   is recommended with follow up bone density scan in 2 years.      Bone densitometry was performed on your patient using our [employer]    densitometer. The results are summarized and a copy of the actual scans   are included for your review. In conformity with the International Society   of Clinical Densitometry's most recent position statement for DXA   interpretation (2015), the diagnosis will be made on the lowest measured   T-score of the lumbar spine, femoral neck, total proximal femur or 33%   radius. Note the change in terminology for diagnostic classification from   OSTEOPENIA to LOW BONE MASS. All trending for sequential exams will be   done using multiple vertebrae or the total proximal femur. Fracture risk   is based on the WHO Fracture Risk Assessment Tool (FRAX). If additional   information is needed or if you would like to discuss the results, please   do not hesitate to call me.       Thank you for referring this patient to [facility].   We are happy to be of service in support of you and your practice. If you   have any questions or suggestions to improve our service, please call me   at [phone].     Sincerely,     JOVANI MossCBORIS.  [facility]steoporosis Services, [facility]       The results of you DEXA scan are above.  You have low bone density that was noted.  Please ensure you are getting an adequate amount of calcium (1,200 mg) and vitamin D (800 IU) daily.  This can achieved with a combination of diet and supplements if needed.    We should repeat the bone density test in 2 years!    Please call with questions or contact us using [email or web address].    Sincerely,        Electronically signed by Erica Phillips MD

## 2021-06-21 NOTE — LETTER
Letter by Erica Phillips MD at      Author: Erica Phillips MD Service: -- Author Type: --    Filed:  Encounter Date: 11/8/2020 Status: (Other)         Jordyn Miller  [id number]  [facility] [address]             November 8, 2020         Dear Ms. Miller,    Below are the results from your recent visit:    Resulted Orders   Lipid Cascade RANDOM   Result Value Ref Range    Cholesterol 178 <=199 mg/dL    Triglycerides 201 (H) <=149 mg/dL    HDL Cholesterol 90 >=50 mg/dL    LDL Calculated 48 <=129 mg/dL    Patient Fasting > 8hrs? No    Basic Metabolic Panel   Result Value Ref Range    Sodium 139 136 - 145 mmol/L    Potassium 4.1 3.5 - 5.0 mmol/L    Chloride 103 98 - 107 mmol/L    CO2 24 22 - 31 mmol/L    Anion Gap, Calculation 12 5 - 18 mmol/L    Glucose 94 70 - 125 mg/dL    Calcium 9.4 8.5 - 10.5 mg/dL    BUN 19 8 - 28 mg/dL    Creatinine 0.78 0.60 - 1.10 mg/dL    GFR MDRD Af Amer >60 >60 mL/min/1.73m2    GFR MDRD Non Af Amer >60 >60 mL/min/1.73m2    Narrative    Fasting Glucose reference range is 70-99 mg/dL per  American Diabetes Association (ADA) guidelines.       So great to see you!  Kidneys, electrolytes and blood sugar are normal.  Cholesterol looks good.    Please call with questions or contact us using [employer].    Sincerely,        Electronically signed by Erica Phillips MD

## 2021-09-11 ENCOUNTER — HEALTH MAINTENANCE LETTER (OUTPATIENT)
Age: 81
End: 2021-09-11

## 2021-09-17 ENCOUNTER — ALLIED HEALTH/NURSE VISIT (OUTPATIENT)
Dept: FAMILY MEDICINE | Facility: CLINIC | Age: 81
End: 2021-09-17
Payer: MEDICARE

## 2021-09-17 DIAGNOSIS — Z23 NEED FOR PROPHYLACTIC VACCINATION AND INOCULATION AGAINST INFLUENZA: Primary | ICD-10-CM

## 2021-09-17 PROCEDURE — 90662 IIV NO PRSV INCREASED AG IM: CPT

## 2021-09-17 PROCEDURE — 99207 PR NO CHARGE NURSE ONLY: CPT

## 2021-09-17 PROCEDURE — G0008 ADMIN INFLUENZA VIRUS VAC: HCPCS

## 2021-11-17 ASSESSMENT — ENCOUNTER SYMPTOMS
CONSTIPATION: 0
FEVER: 0
DIZZINESS: 0
HEMATOCHEZIA: 0
MYALGIAS: 1
NAUSEA: 0
JOINT SWELLING: 0
PARESTHESIAS: 1
DIARRHEA: 0
SORE THROAT: 0
FREQUENCY: 1
ARTHRALGIAS: 1
HEARTBURN: 0
ABDOMINAL PAIN: 0
WEAKNESS: 0
PALPITATIONS: 0
SHORTNESS OF BREATH: 0
HEADACHES: 0
CHILLS: 0
HEMATURIA: 0
COUGH: 0
EYE PAIN: 1
DYSURIA: 0
NERVOUS/ANXIOUS: 0
BREAST MASS: 0

## 2021-11-17 ASSESSMENT — ACTIVITIES OF DAILY LIVING (ADL): CURRENT_FUNCTION: NO ASSISTANCE NEEDED

## 2021-11-19 ENCOUNTER — OFFICE VISIT (OUTPATIENT)
Dept: FAMILY MEDICINE | Facility: CLINIC | Age: 81
End: 2021-11-19
Payer: MEDICARE

## 2021-11-19 VITALS
HEIGHT: 62 IN | SYSTOLIC BLOOD PRESSURE: 128 MMHG | TEMPERATURE: 97.6 F | BODY MASS INDEX: 23 KG/M2 | DIASTOLIC BLOOD PRESSURE: 74 MMHG | HEART RATE: 96 BPM | OXYGEN SATURATION: 98 % | WEIGHT: 125 LBS

## 2021-11-19 DIAGNOSIS — Z00.00 ENCOUNTER FOR MEDICARE ANNUAL WELLNESS EXAM: Primary | ICD-10-CM

## 2021-11-19 DIAGNOSIS — I10 ESSENTIAL HYPERTENSION: ICD-10-CM

## 2021-11-19 DIAGNOSIS — I10 BENIGN ESSENTIAL HYPERTENSION: ICD-10-CM

## 2021-11-19 DIAGNOSIS — E78.5 HYPERLIPIDEMIA LDL GOAL <130: ICD-10-CM

## 2021-11-19 LAB
ALBUMIN SERPL-MCNC: 3.9 G/DL (ref 3.4–5)
ALP SERPL-CCNC: 100 U/L (ref 40–150)
ALT SERPL W P-5'-P-CCNC: 30 U/L (ref 0–50)
ANION GAP SERPL CALCULATED.3IONS-SCNC: 5 MMOL/L (ref 3–14)
AST SERPL W P-5'-P-CCNC: 30 U/L (ref 0–45)
BILIRUB SERPL-MCNC: 0.9 MG/DL (ref 0.2–1.3)
BUN SERPL-MCNC: 26 MG/DL (ref 7–30)
CALCIUM SERPL-MCNC: 9.8 MG/DL (ref 8.5–10.1)
CHLORIDE BLD-SCNC: 103 MMOL/L (ref 94–109)
CHOLEST SERPL-MCNC: 209 MG/DL
CO2 SERPL-SCNC: 29 MMOL/L (ref 20–32)
CREAT SERPL-MCNC: 0.87 MG/DL (ref 0.52–1.04)
FASTING STATUS PATIENT QL REPORTED: ABNORMAL
GFR SERPL CREATININE-BSD FRML MDRD: 63 ML/MIN/1.73M2
GLUCOSE BLD-MCNC: 106 MG/DL (ref 70–99)
HDLC SERPL-MCNC: 84 MG/DL
LDLC SERPL CALC-MCNC: 103 MG/DL
NONHDLC SERPL-MCNC: 125 MG/DL
POTASSIUM BLD-SCNC: 4.7 MMOL/L (ref 3.4–5.3)
PROT SERPL-MCNC: 7.6 G/DL (ref 6.8–8.8)
SODIUM SERPL-SCNC: 137 MMOL/L (ref 133–144)
TRIGL SERPL-MCNC: 112 MG/DL

## 2021-11-19 PROCEDURE — 80061 LIPID PANEL: CPT | Performed by: FAMILY MEDICINE

## 2021-11-19 PROCEDURE — G0439 PPPS, SUBSEQ VISIT: HCPCS | Performed by: FAMILY MEDICINE

## 2021-11-19 PROCEDURE — 80053 COMPREHEN METABOLIC PANEL: CPT | Performed by: FAMILY MEDICINE

## 2021-11-19 PROCEDURE — 36415 COLL VENOUS BLD VENIPUNCTURE: CPT | Performed by: FAMILY MEDICINE

## 2021-11-19 RX ORDER — SIMVASTATIN 20 MG
20 TABLET ORAL DAILY
Qty: 90 TABLET | Refills: 3 | Status: SHIPPED | OUTPATIENT
Start: 2021-11-19 | End: 2023-01-01

## 2021-11-19 RX ORDER — ENALAPRIL MALEATE AND HYDROCHLOROTHIAZIDE 10; 25 MG/1; MG/1
TABLET ORAL
Qty: 180 TABLET | Refills: 3 | Status: SHIPPED | OUTPATIENT
Start: 2021-11-19 | End: 2023-01-01

## 2021-11-19 ASSESSMENT — MIFFLIN-ST. JEOR: SCORE: 990.25

## 2021-11-19 ASSESSMENT — PAIN SCALES - GENERAL: PAINLEVEL: NO PAIN (0)

## 2021-11-19 NOTE — PATIENT INSTRUCTIONS
Patient Education   Personalized Prevention Plan  You are due for the preventive services outlined below.  Your care team is available to assist you in scheduling these services.  If you have already completed any of these items, please share that information with your care team to update in your medical record.  Health Maintenance Due   Topic Date Due     ANNUAL REVIEW OF HM ORDERS  Never done     Diptheria Tetanus Pertussis (DTAP/TDAP/TD) Vaccine (1 - Tdap) Never done     Zoster (Shingles) Vaccine (2 of 3) 05/10/2016     COVID-19 Vaccine (3 - Booster for Moderna series) 09/04/2021     FALL RISK ASSESSMENT  11/06/2021     Annual Wellness Visit  11/06/2021

## 2021-11-19 NOTE — PROGRESS NOTES
"  SUBJECTIVE:   Jordyn Miller is a 81 year old female who presents for Preventive Visit.      Patient has been advised of split billing requirements and indicates understanding: Yes  Are you in the first 12 months of your Medicare Part B coverage?  No    Answers for HPI/ROS submitted by the patient on 11/17/2021  In general, how would you rate your overall physical health?: good  Frequency of exercise:: 6-7 days/week  Do you usually eat at least 4 servings of fruit and vegetables a day, include whole grains & fiber, and avoid regularly eating high fat or \"junk\" foods? : Yes  Taking medications regularly:: Yes  Medication side effects:: None  Activities of Daily Living: no assistance needed  Home safety: no safety concerns identified  Hearing Impairment:: no hearing concerns  In the past 6 months, have you been bothered by leaking of urine?: No  abdominal pain: No  Blood in stool: No  Blood in urine: No  chest pain: No  chills: No  congestion: Yes  constipation: No  cough: No  diarrhea: No  dizziness: No  ear pain: No  eye pain: Yes  nervous/anxious: No  fever: No  frequency: Yes  genital sores: No  headaches: No  hearing loss: No  heartburn: No  arthralgias: Yes  joint swelling: No  peripheral edema: No  mood changes: No  myalgias: Yes  nausea: No  dysuria: No  palpitations: No  Skin sensation changes: Yes  sore throat: No  urgency: No  rash: No  shortness of breath: No  visual disturbance: No  weakness: No  pelvic pain: No  vaginal bleeding: No  vaginal discharge: No  tenderness: No  breast mass: No  breast discharge: No  In general, how would you rate your overall mental or emotional health?: good  Additional concerns today:: No  Duration of exercise:: Less than 15 minutes      PHQ-2 Score: (P) 0    Do you feel safe in your environment? Yes    Have you ever done Advance Care Planning? (For example, a Health Directive, POLST, or a discussion with a medical provider or your loved ones about your wishes): Yes, " patient states has an Advance Care Planning document and will bring a copy to the clinic.    Additional concerns to address?  No    Fall risk:  Fallen 2 or more times in the past year?: No  Any fall with injury in the past year?: No    Cognitive Screenin) Repeat 3 items (Leader, Season, Table)    2) Clock draw: ABNORMAL   3) 3 item recall: Recalls 3 objects  Results: 3 items recalled: COGNITIVE IMPAIRMENT LESS LIKELY    Mini-CogTM Copyright CECILLE Condon. Licensed by the author for use in [facility]; reprinted with permission (constanza@[facility]). All rights reserved.      Do you have sleep apnea, excessive snoring or daytime drowsiness?: no            Reviewed and updated as needed this visit by clinical staff  Tobacco  Allergies  Meds   Med Hx  Surg Hx  Fam Hx  Soc Hx       Reviewed and updated as needed this visit by Provider               Social History     Tobacco Use     Smoking status: Current Every Day Smoker     Packs/day: 0.50     Types: Cigarettes, Cigarettes, Cigarettes     Smokeless tobacco: Never Used     Tobacco comment: 0.5-1.0 pack per day   Substance Use Topics     Alcohol use: Yes     Comment: Occasional                           Current providers sharing in care for this patient include:   Patient Care Team:  Erica Phillips MD as PCP - General (Family Medicine)  Erica Phillips MD as Assigned PCP    The following health maintenance items are reviewed in Epic and correct as of today:  Health Maintenance   Topic Date Due     ANNUAL REVIEW OF  ORDERS  Never done     DTAP/TDAP/TD IMMUNIZATION (1 - Tdap) Never done     ZOSTER IMMUNIZATION (2 of 3) 05/10/2016     COVID-19 Vaccine (3 - Booster for Moderna series) 2021     FALL RISK ASSESSMENT  2021     MEDICARE ANNUAL WELLNESS VISIT  2022     LIPID  2025     ADVANCE CARE PLANNING  2025     Pneumococcal Vaccine: 65+ Years (1 of 1 - PPSV23) 2025     DEXA  2036     PHQ-2  Completed  "    INFLUENZA VACCINE  Completed     IPV IMMUNIZATION  Aged Out     MENINGITIS IMMUNIZATION  Aged Out     HEPATITIS B IMMUNIZATION  Aged Out     Lab work is in process  Discussed shingles and TDap - she will consider    ROS:  12 point review systems negative except for what is above.    OBJECTIVE:   /74   Pulse 96   Temp 97.6  F (36.4  C) (Tympanic)   Ht 1.575 m (5' 2\")   Wt 56.7 kg (125 lb)   SpO2 98%   BMI 22.86 kg/m   Estimated body mass index is 22.86 kg/m  as calculated from the following:    Height as of this encounter: 1.575 m (5' 2\").    Weight as of this encounter: 56.7 kg (125 lb).  EXAM:   Objective:  Vital signs reviewed and stable  General: No acute distress  Psych: Appropriate affect  HEENT: moist mucous membranes, pupils equal, round, reactive to light and accomodation, tympanic membranes are pearly grey bilaterally  Lymph: no cervical or supraclavicular lymphadenopathy  Cardiovascular: regular rate and rhythm with no murmur  Pulmonary: clear to auscultation bilaterally with no wheeze  Abdomen: soft, non tender, non distended with normo-active bowel sounds  Extremities: warm and well perfused with no edema  Skin: warm and dry with no rash      Diagnostic Test Results:  Labs reviewed in Epic    ASSESSMENT / PLAN:   Jordyn was seen today for physical.    Diagnoses and all orders for this visit:    Encounter for Medicare annual wellness exam    Essential hypertension  At goal  -     COMPREHENSIVE METABOLIC PANEL; Future  -     enalapril-hydrochlorothiazide (VASERETIC) 10-25 MG tablet; TAKE 2 TABLETS BY MOUTH EVERY DAY  -     COMPREHENSIVE METABOLIC PANEL    Benign essential hypertension    Hyperlipidemia LDL goal <130  -     Lipid panel reflex to direct LDL Fasting; Future  -     simvastatin (ZOCOR) 20 MG tablet; Take 1 tablet (20 mg) by mouth daily  -     Lipid panel reflex to direct LDL Fasting        Patient has been advised of split billing requirements and indicates understanding: " "Yes    COUNSELING:  Reviewed preventive health counseling, as reflected in patient instructions    Estimated body mass index is 22.86 kg/m  as calculated from the following:    Height as of this encounter: 1.575 m (5' 2\").    Weight as of this encounter: 56.7 kg (125 lb).        She reports that she has been smoking cigarettes, cigarettes, and cigarettes. She has been smoking about 0.50 packs per day. She has never used smokeless tobacco.  Tobacco Cessation Action Plan:   Information offered: Patient not interested at this time    Appropriate preventive services were discussed with this patient, including applicable screening as appropriate for cardiovascular disease, diabetes, osteopenia/osteoporosis, and glaucoma.  As appropriate for age/gender, discussed screening for colorectal cancer, prostate cancer, breast cancer, and cervical cancer. Checklist reviewing preventive services available has been given to the patient.    Reviewed patients plan of care and provided an AVS. The Basic Care Plan (routine screening as documented in Health Maintenance) for Jordyn meets the Care Plan requirement. This Care Plan has been established and reviewed with the Patient.    Counseling Resources:  ATP IV Guidelines  Pooled Cohorts Equation Calculator  Breast Cancer Risk Calculator  BRCA-Related Cancer Risk Assessment: FHS-7 Tool  FRAX Risk Assessment  ICSI Preventive Guidelines  Dietary Guidelines for Americans, 2010  USDA's MyPlate  ASA Prophylaxis  Lung CA Screening    Erica Phillips MD  [facility]  "

## 2022-01-01 ENCOUNTER — HEALTH MAINTENANCE LETTER (OUTPATIENT)
Age: 82
End: 2022-01-01

## 2022-03-11 ENCOUNTER — ALLIED HEALTH/NURSE VISIT (OUTPATIENT)
Dept: FAMILY MEDICINE | Facility: CLINIC | Age: 82
End: 2022-03-11
Payer: MEDICARE

## 2022-03-11 DIAGNOSIS — Z23 NEED FOR VACCINATION: Primary | ICD-10-CM

## 2022-03-11 PROCEDURE — 90732 PPSV23 VACC 2 YRS+ SUBQ/IM: CPT

## 2022-03-11 PROCEDURE — G0009 ADMIN PNEUMOCOCCAL VACCINE: HCPCS

## 2022-03-11 PROCEDURE — 99207 PR NO CHARGE NURSE ONLY: CPT

## 2023-01-01 ENCOUNTER — OFFICE VISIT (OUTPATIENT)
Dept: FAMILY MEDICINE | Facility: CLINIC | Age: 83
End: 2023-01-01
Payer: MEDICARE

## 2023-01-01 ENCOUNTER — APPOINTMENT (OUTPATIENT)
Dept: CT IMAGING | Facility: HOSPITAL | Age: 83
DRG: 054 | End: 2023-01-01
Attending: STUDENT IN AN ORGANIZED HEALTH CARE EDUCATION/TRAINING PROGRAM
Payer: MEDICARE

## 2023-01-01 ENCOUNTER — TELEPHONE (OUTPATIENT)
Dept: RADIATION ONCOLOGY | Facility: HOSPITAL | Age: 83
End: 2023-01-01
Payer: MEDICARE

## 2023-01-01 ENCOUNTER — TELEPHONE (OUTPATIENT)
Dept: FAMILY MEDICINE | Facility: CLINIC | Age: 83
End: 2023-01-01
Payer: MEDICARE

## 2023-01-01 ENCOUNTER — APPOINTMENT (OUTPATIENT)
Dept: MRI IMAGING | Facility: HOSPITAL | Age: 83
DRG: 054 | End: 2023-01-01
Attending: EMERGENCY MEDICINE
Payer: MEDICARE

## 2023-01-01 ENCOUNTER — HOSPITAL ENCOUNTER (OUTPATIENT)
Dept: BONE DENSITY | Facility: HOSPITAL | Age: 83
Discharge: HOME | End: 2023-03-15
Attending: FAMILY MEDICINE | Admitting: FAMILY MEDICINE
Payer: MEDICARE

## 2023-01-01 ENCOUNTER — HOSPITAL ENCOUNTER (INPATIENT)
Facility: HOSPITAL | Age: 83
LOS: 1 days | Discharge: HOSPICE | DRG: 054 | End: 2023-06-23
Attending: EMERGENCY MEDICINE | Admitting: HOSPITALIST
Payer: MEDICARE

## 2023-01-01 ENCOUNTER — DOCUMENTATION ONLY (OUTPATIENT)
Dept: OTHER | Facility: CLINIC | Age: 83
End: 2023-01-01
Payer: MEDICARE

## 2023-01-01 ENCOUNTER — APPOINTMENT (OUTPATIENT)
Dept: CT IMAGING | Facility: HOSPITAL | Age: 83
DRG: 054 | End: 2023-01-01
Attending: EMERGENCY MEDICINE
Payer: MEDICARE

## 2023-01-01 VITALS
OXYGEN SATURATION: 92 % | HEIGHT: 64 IN | RESPIRATION RATE: 18 BRPM | HEART RATE: 90 BPM | TEMPERATURE: 97.8 F | WEIGHT: 120 LBS | BODY MASS INDEX: 20.49 KG/M2 | DIASTOLIC BLOOD PRESSURE: 84 MMHG | SYSTOLIC BLOOD PRESSURE: 134 MMHG

## 2023-01-01 VITALS
HEART RATE: 94 BPM | OXYGEN SATURATION: 95 % | WEIGHT: 118 LBS | RESPIRATION RATE: 20 BRPM | TEMPERATURE: 97.9 F | DIASTOLIC BLOOD PRESSURE: 64 MMHG | BODY MASS INDEX: 20.14 KG/M2 | SYSTOLIC BLOOD PRESSURE: 122 MMHG | HEIGHT: 64 IN

## 2023-01-01 VITALS
TEMPERATURE: 98.9 F | HEART RATE: 105 BPM | OXYGEN SATURATION: 98 % | RESPIRATION RATE: 16 BRPM | SYSTOLIC BLOOD PRESSURE: 132 MMHG | HEIGHT: 64 IN | BODY MASS INDEX: 20.87 KG/M2 | WEIGHT: 122.25 LBS | DIASTOLIC BLOOD PRESSURE: 74 MMHG

## 2023-01-01 DIAGNOSIS — F80.9 SPEECH AND LANGUAGE DEFICITS: Primary | ICD-10-CM

## 2023-01-01 DIAGNOSIS — E78.5 HYPERLIPIDEMIA LDL GOAL <130: ICD-10-CM

## 2023-01-01 DIAGNOSIS — Z00.00 ENCOUNTER FOR MEDICARE ANNUAL WELLNESS EXAM: Primary | ICD-10-CM

## 2023-01-01 DIAGNOSIS — C79.32: Primary | ICD-10-CM

## 2023-01-01 DIAGNOSIS — Z78.0 POST-MENOPAUSAL: ICD-10-CM

## 2023-01-01 DIAGNOSIS — I10 ESSENTIAL HYPERTENSION: ICD-10-CM

## 2023-01-01 DIAGNOSIS — M85.80 OSTEOPENIA, UNSPECIFIED LOCATION: Primary | ICD-10-CM

## 2023-01-01 DIAGNOSIS — I10 ESSENTIAL HYPERTENSION: Primary | ICD-10-CM

## 2023-01-01 DIAGNOSIS — R41.82 ALTERED MENTAL STATUS, UNSPECIFIED ALTERED MENTAL STATUS TYPE: ICD-10-CM

## 2023-01-01 DIAGNOSIS — H53.461 HOMONYMOUS HEMIANOPIA, RIGHT: ICD-10-CM

## 2023-01-01 DIAGNOSIS — R90.0 INTRACRANIAL MASS: ICD-10-CM

## 2023-01-01 LAB
ANION GAP SERPL CALCULATED.3IONS-SCNC: 11 MMOL/L (ref 7–15)
ANION GAP SERPL CALCULATED.3IONS-SCNC: 12 MMOL/L (ref 7–15)
APTT PPP: 41 SECONDS (ref 22–38)
ATRIAL RATE - MUSE: 83 BPM
BASOPHILS # BLD AUTO: 0.1 10E3/UL (ref 0–0.2)
BASOPHILS NFR BLD AUTO: 1 %
BUN SERPL-MCNC: 22.2 MG/DL (ref 8–23)
BUN SERPL-MCNC: 31.7 MG/DL (ref 8–23)
CALCIUM SERPL-MCNC: 10 MG/DL (ref 8.8–10.2)
CALCIUM SERPL-MCNC: 10.4 MG/DL (ref 8.8–10.2)
CHLORIDE SERPL-SCNC: 102 MMOL/L (ref 98–107)
CHLORIDE SERPL-SCNC: 104 MMOL/L (ref 98–107)
CHOLEST SERPL-MCNC: 211 MG/DL
CREAT SERPL-MCNC: 0.99 MG/DL (ref 0.51–0.95)
CREAT SERPL-MCNC: 1.01 MG/DL (ref 0.51–0.95)
DEPRECATED HCO3 PLAS-SCNC: 27 MMOL/L (ref 22–29)
DEPRECATED HCO3 PLAS-SCNC: 27 MMOL/L (ref 22–29)
DIASTOLIC BLOOD PRESSURE - MUSE: NORMAL MMHG
EOSINOPHIL # BLD AUTO: 0 10E3/UL (ref 0–0.7)
EOSINOPHIL NFR BLD AUTO: 1 %
ERYTHROCYTE [DISTWIDTH] IN BLOOD BY AUTOMATED COUNT: 14.1 % (ref 10–15)
ERYTHROCYTE [DISTWIDTH] IN BLOOD BY AUTOMATED COUNT: 14.3 % (ref 10–15)
GFR SERPL CREATININE-BSD FRML MDRD: 56 ML/MIN/1.73M2
GFR SERPL CREATININE-BSD FRML MDRD: 57 ML/MIN/1.73M2
GLUCOSE BLDC GLUCOMTR-MCNC: 161 MG/DL (ref 70–99)
GLUCOSE BLDC GLUCOMTR-MCNC: 94 MG/DL (ref 70–99)
GLUCOSE SERPL-MCNC: 108 MG/DL (ref 70–99)
GLUCOSE SERPL-MCNC: 99 MG/DL (ref 70–99)
HCT VFR BLD AUTO: 38.7 % (ref 35–47)
HCT VFR BLD AUTO: 40.5 % (ref 35–47)
HDLC SERPL-MCNC: 78 MG/DL
HGB BLD-MCNC: 13.4 G/DL (ref 11.7–15.7)
HGB BLD-MCNC: 14.2 G/DL (ref 11.7–15.7)
HOLD SPECIMEN: NORMAL
IMM GRANULOCYTES # BLD: 0 10E3/UL
IMM GRANULOCYTES NFR BLD: 0 %
INR PPP: 0.96 (ref 0.85–1.15)
INTERPRETATION ECG - MUSE: NORMAL
LDLC SERPL CALC-MCNC: 97 MG/DL
LEVETIRACETAM SERPL-MCNC: <2 ΜG/ML (ref 10–40)
LYMPHOCYTES # BLD AUTO: 1.3 10E3/UL (ref 0.8–5.3)
LYMPHOCYTES NFR BLD AUTO: 16 %
MAGNESIUM SERPL-MCNC: 1.3 MG/DL (ref 1.7–2.3)
MAGNESIUM SERPL-MCNC: 3.2 MG/DL (ref 1.7–2.3)
MCH RBC QN AUTO: 32 PG (ref 26.5–33)
MCH RBC QN AUTO: 32.1 PG (ref 26.5–33)
MCHC RBC AUTO-ENTMCNC: 34.6 G/DL (ref 31.5–36.5)
MCHC RBC AUTO-ENTMCNC: 35.1 G/DL (ref 31.5–36.5)
MCV RBC AUTO: 91 FL (ref 78–100)
MCV RBC AUTO: 92 FL (ref 78–100)
MONOCYTES # BLD AUTO: 0.7 10E3/UL (ref 0–1.3)
MONOCYTES NFR BLD AUTO: 9 %
NEUTROPHILS # BLD AUTO: 5.6 10E3/UL (ref 1.6–8.3)
NEUTROPHILS NFR BLD AUTO: 73 %
NONHDLC SERPL-MCNC: 133 MG/DL
NRBC # BLD AUTO: 0 10E3/UL
NRBC BLD AUTO-RTO: 0 /100
P AXIS - MUSE: 75 DEGREES
PLATELET # BLD AUTO: 206 10E3/UL (ref 150–450)
PLATELET # BLD AUTO: 214 10E3/UL (ref 150–450)
POTASSIUM SERPL-SCNC: 4.1 MMOL/L (ref 3.4–5.3)
POTASSIUM SERPL-SCNC: 4.2 MMOL/L (ref 3.4–5.3)
POTASSIUM SERPL-SCNC: 4.5 MMOL/L (ref 3.4–5.3)
PR INTERVAL - MUSE: 146 MS
QRS DURATION - MUSE: 78 MS
QT - MUSE: 352 MS
QTC - MUSE: 413 MS
R AXIS - MUSE: 53 DEGREES
RBC # BLD AUTO: 4.19 10E6/UL (ref 3.8–5.2)
RBC # BLD AUTO: 4.43 10E6/UL (ref 3.8–5.2)
SODIUM SERPL-SCNC: 140 MMOL/L (ref 136–145)
SODIUM SERPL-SCNC: 143 MMOL/L (ref 136–145)
SYSTOLIC BLOOD PRESSURE - MUSE: NORMAL MMHG
T AXIS - MUSE: 59 DEGREES
TRIGL SERPL-MCNC: 178 MG/DL
TROPONIN T SERPL HS-MCNC: 16 NG/L
VENTRICULAR RATE- MUSE: 83 BPM
WBC # BLD AUTO: 6.2 10E3/UL (ref 4–11)
WBC # BLD AUTO: 7.7 10E3/UL (ref 4–11)

## 2023-01-01 PROCEDURE — 250N000011 HC RX IP 250 OP 636: Mod: JZ | Performed by: EMERGENCY MEDICINE

## 2023-01-01 PROCEDURE — 82310 ASSAY OF CALCIUM: CPT | Performed by: STUDENT IN AN ORGANIZED HEALTH CARE EDUCATION/TRAINING PROGRAM

## 2023-01-01 PROCEDURE — 99213 OFFICE O/P EST LOW 20 MIN: CPT | Performed by: FAMILY MEDICINE

## 2023-01-01 PROCEDURE — 258N000003 HC RX IP 258 OP 636: Performed by: EMERGENCY MEDICINE

## 2023-01-01 PROCEDURE — 250N000013 HC RX MED GY IP 250 OP 250 PS 637: Performed by: HOSPITALIST

## 2023-01-01 PROCEDURE — 77080 DXA BONE DENSITY AXIAL: CPT

## 2023-01-01 PROCEDURE — 84132 ASSAY OF SERUM POTASSIUM: CPT | Performed by: HOSPITALIST

## 2023-01-01 PROCEDURE — 80061 LIPID PANEL: CPT | Performed by: FAMILY MEDICINE

## 2023-01-01 PROCEDURE — G0439 PPPS, SUBSEQ VISIT: HCPCS | Performed by: FAMILY MEDICINE

## 2023-01-01 PROCEDURE — 70496 CT ANGIOGRAPHY HEAD: CPT | Mod: MG

## 2023-01-01 PROCEDURE — 36415 COLL VENOUS BLD VENIPUNCTURE: CPT | Performed by: HOSPITALIST

## 2023-01-01 PROCEDURE — G1010 CDSM STANSON: HCPCS

## 2023-01-01 PROCEDURE — 82962 GLUCOSE BLOOD TEST: CPT

## 2023-01-01 PROCEDURE — 90471 IMMUNIZATION ADMIN: CPT | Performed by: FAMILY MEDICINE

## 2023-01-01 PROCEDURE — 84484 ASSAY OF TROPONIN QUANT: CPT | Performed by: STUDENT IN AN ORGANIZED HEALTH CARE EDUCATION/TRAINING PROGRAM

## 2023-01-01 PROCEDURE — 258N000003 HC RX IP 258 OP 636: Performed by: HOSPITALIST

## 2023-01-01 PROCEDURE — 99497 ADVNCD CARE PLAN 30 MIN: CPT | Mod: 25 | Performed by: FAMILY MEDICINE

## 2023-01-01 PROCEDURE — 99292 CRITICAL CARE ADDL 30 MIN: CPT

## 2023-01-01 PROCEDURE — 99223 1ST HOSP IP/OBS HIGH 75: CPT | Performed by: INTERNAL MEDICINE

## 2023-01-01 PROCEDURE — 36415 COLL VENOUS BLD VENIPUNCTURE: CPT | Performed by: EMERGENCY MEDICINE

## 2023-01-01 PROCEDURE — 90714 TD VACC NO PRESV 7 YRS+ IM: CPT | Performed by: FAMILY MEDICINE

## 2023-01-01 PROCEDURE — 0042T CT HEAD PERFUSION W CONTRAST: CPT

## 2023-01-01 PROCEDURE — 80048 BASIC METABOLIC PNL TOTAL CA: CPT | Performed by: FAMILY MEDICINE

## 2023-01-01 PROCEDURE — 74177 CT ABD & PELVIS W/CONTRAST: CPT | Mod: MG

## 2023-01-01 PROCEDURE — 85027 COMPLETE CBC AUTOMATED: CPT | Performed by: HOSPITALIST

## 2023-01-01 PROCEDURE — 83735 ASSAY OF MAGNESIUM: CPT | Performed by: HOSPITALIST

## 2023-01-01 PROCEDURE — 99222 1ST HOSP IP/OBS MODERATE 55: CPT | Performed by: RADIOLOGY

## 2023-01-01 PROCEDURE — 250N000011 HC RX IP 250 OP 636: Mod: JZ | Performed by: HOSPITALIST

## 2023-01-01 PROCEDURE — 99223 1ST HOSP IP/OBS HIGH 75: CPT | Performed by: PSYCHIATRY & NEUROLOGY

## 2023-01-01 PROCEDURE — 120N000001 HC R&B MED SURG/OB

## 2023-01-01 PROCEDURE — 250N000012 HC RX MED GY IP 250 OP 636 PS 637: Performed by: HOSPITALIST

## 2023-01-01 PROCEDURE — 85610 PROTHROMBIN TIME: CPT | Performed by: STUDENT IN AN ORGANIZED HEALTH CARE EDUCATION/TRAINING PROGRAM

## 2023-01-01 PROCEDURE — 85014 HEMATOCRIT: CPT | Performed by: STUDENT IN AN ORGANIZED HEALTH CARE EDUCATION/TRAINING PROGRAM

## 2023-01-01 PROCEDURE — 82374 ASSAY BLOOD CARBON DIOXIDE: CPT | Performed by: STUDENT IN AN ORGANIZED HEALTH CARE EDUCATION/TRAINING PROGRAM

## 2023-01-01 PROCEDURE — 255N000002 HC RX 255 OP 636: Mod: JZ | Performed by: EMERGENCY MEDICINE

## 2023-01-01 PROCEDURE — 99291 CRITICAL CARE FIRST HOUR: CPT | Mod: 25

## 2023-01-01 PROCEDURE — 96365 THER/PROPH/DIAG IV INF INIT: CPT | Mod: 59

## 2023-01-01 PROCEDURE — 99239 HOSP IP/OBS DSCHRG MGMT >30: CPT | Performed by: INTERNAL MEDICINE

## 2023-01-01 PROCEDURE — 70498 CT ANGIOGRAPHY NECK: CPT | Mod: MG

## 2023-01-01 PROCEDURE — 99213 OFFICE O/P EST LOW 20 MIN: CPT | Mod: 25 | Performed by: FAMILY MEDICINE

## 2023-01-01 PROCEDURE — 96375 TX/PRO/DX INJ NEW DRUG ADDON: CPT

## 2023-01-01 PROCEDURE — A9585 GADOBUTROL INJECTION: HCPCS | Mod: JZ | Performed by: EMERGENCY MEDICINE

## 2023-01-01 PROCEDURE — 36415 COLL VENOUS BLD VENIPUNCTURE: CPT | Performed by: STUDENT IN AN ORGANIZED HEALTH CARE EDUCATION/TRAINING PROGRAM

## 2023-01-01 PROCEDURE — 99223 1ST HOSP IP/OBS HIGH 75: CPT | Mod: AI | Performed by: HOSPITALIST

## 2023-01-01 PROCEDURE — 99223 1ST HOSP IP/OBS HIGH 75: CPT | Performed by: FAMILY MEDICINE

## 2023-01-01 PROCEDURE — 93005 ELECTROCARDIOGRAM TRACING: CPT | Performed by: STUDENT IN AN ORGANIZED HEALTH CARE EDUCATION/TRAINING PROGRAM

## 2023-01-01 PROCEDURE — 250N000013 HC RX MED GY IP 250 OP 250 PS 637: Performed by: FAMILY MEDICINE

## 2023-01-01 PROCEDURE — 85730 THROMBOPLASTIN TIME PARTIAL: CPT | Performed by: STUDENT IN AN ORGANIZED HEALTH CARE EDUCATION/TRAINING PROGRAM

## 2023-01-01 PROCEDURE — 99207 PR NO CHARGE LOS: CPT | Performed by: PHYSICIAN ASSISTANT

## 2023-01-01 PROCEDURE — 36415 COLL VENOUS BLD VENIPUNCTURE: CPT | Performed by: FAMILY MEDICINE

## 2023-01-01 PROCEDURE — 80177 DRUG SCRN QUAN LEVETIRACETAM: CPT | Performed by: PSYCHIATRY & NEUROLOGY

## 2023-01-01 RX ORDER — ENOXAPARIN SODIUM 100 MG/ML
40 INJECTION SUBCUTANEOUS EVERY 24 HOURS
Status: DISCONTINUED | OUTPATIENT
Start: 2023-01-01 | End: 2023-01-01 | Stop reason: HOSPADM

## 2023-01-01 RX ORDER — DEXAMETHASONE 2 MG/1
4 TABLET ORAL EVERY 6 HOURS
Status: DISCONTINUED | OUTPATIENT
Start: 2023-01-01 | End: 2023-01-01 | Stop reason: HOSPADM

## 2023-01-01 RX ORDER — AMOXICILLIN 250 MG
2 CAPSULE ORAL 2 TIMES DAILY PRN
Status: DISCONTINUED | OUTPATIENT
Start: 2023-01-01 | End: 2023-01-01 | Stop reason: HOSPADM

## 2023-01-01 RX ORDER — DEXAMETHASONE 4 MG/1
TABLET ORAL
Qty: 26 TABLET | Refills: 0 | Status: SHIPPED | OUTPATIENT
Start: 2023-01-01 | End: 2023-01-01

## 2023-01-01 RX ORDER — MAGNESIUM SULFATE 4 G/50ML
4 INJECTION INTRAVENOUS ONCE
Status: COMPLETED | OUTPATIENT
Start: 2023-01-01 | End: 2023-01-01

## 2023-01-01 RX ORDER — GADOBUTROL 604.72 MG/ML
5 INJECTION INTRAVENOUS ONCE
Status: COMPLETED | OUTPATIENT
Start: 2023-01-01 | End: 2023-01-01

## 2023-01-01 RX ORDER — SIMVASTATIN 10 MG
20 TABLET ORAL DAILY
Status: DISCONTINUED | OUTPATIENT
Start: 2023-01-01 | End: 2023-01-01 | Stop reason: HOSPADM

## 2023-01-01 RX ORDER — LORAZEPAM 2 MG/ML
1 INJECTION INTRAMUSCULAR ONCE
Status: COMPLETED | OUTPATIENT
Start: 2023-01-01 | End: 2023-01-01

## 2023-01-01 RX ORDER — NICOTINE 21 MG/24HR
1 PATCH, TRANSDERMAL 24 HOURS TRANSDERMAL DAILY
Status: DISCONTINUED | OUTPATIENT
Start: 2023-01-01 | End: 2023-01-01 | Stop reason: HOSPADM

## 2023-01-01 RX ORDER — ENALAPRIL MALEATE 5 MG/1
10 TABLET ORAL DAILY
Status: DISCONTINUED | OUTPATIENT
Start: 2023-01-01 | End: 2023-01-01 | Stop reason: HOSPADM

## 2023-01-01 RX ORDER — IOPAMIDOL 755 MG/ML
50 INJECTION, SOLUTION INTRAVASCULAR ONCE
Status: COMPLETED | OUTPATIENT
Start: 2023-01-01 | End: 2023-01-01

## 2023-01-01 RX ORDER — LANOLIN ALCOHOL/MO/W.PET/CERES
3 CREAM (GRAM) TOPICAL
Status: DISCONTINUED | OUTPATIENT
Start: 2023-01-01 | End: 2023-01-01 | Stop reason: HOSPADM

## 2023-01-01 RX ORDER — SENNOSIDES 8.6 MG
1 TABLET ORAL AT BEDTIME
Status: DISCONTINUED | OUTPATIENT
Start: 2023-01-01 | End: 2023-01-01 | Stop reason: HOSPADM

## 2023-01-01 RX ORDER — ESTRADIOL 1 MG/1
1 TABLET ORAL DAILY
COMMUNITY

## 2023-01-01 RX ORDER — SIMVASTATIN 20 MG
20 TABLET ORAL DAILY
Qty: 90 TABLET | Refills: 3 | Status: SHIPPED | OUTPATIENT
Start: 2023-01-01

## 2023-01-01 RX ORDER — ACETAMINOPHEN 650 MG/1
650 SUPPOSITORY RECTAL EVERY 6 HOURS PRN
Status: DISCONTINUED | OUTPATIENT
Start: 2023-01-01 | End: 2023-01-01 | Stop reason: HOSPADM

## 2023-01-01 RX ORDER — LEVETIRACETAM 500 MG/1
500 TABLET ORAL 2 TIMES DAILY
Qty: 60 TABLET | Refills: 1 | Status: SHIPPED | OUTPATIENT
Start: 2023-01-01

## 2023-01-01 RX ORDER — ENALAPRIL MALEATE AND HYDROCHLOROTHIAZIDE 10; 25 MG/1; MG/1
TABLET ORAL
Qty: 180 TABLET | Refills: 0 | Status: SHIPPED | OUTPATIENT
Start: 2023-01-01 | End: 2023-01-01

## 2023-01-01 RX ORDER — AMOXICILLIN 250 MG
1 CAPSULE ORAL 2 TIMES DAILY PRN
Status: DISCONTINUED | OUTPATIENT
Start: 2023-01-01 | End: 2023-01-01 | Stop reason: HOSPADM

## 2023-01-01 RX ORDER — ENALAPRIL MALEATE AND HYDROCHLOROTHIAZIDE 10; 25 MG/1; MG/1
TABLET ORAL
Qty: 90 TABLET | Refills: 3 | Status: SHIPPED | OUTPATIENT
Start: 2023-01-01

## 2023-01-01 RX ORDER — IOPAMIDOL 755 MG/ML
67 INJECTION, SOLUTION INTRAVASCULAR ONCE
Status: COMPLETED | OUTPATIENT
Start: 2023-01-01 | End: 2023-01-01

## 2023-01-01 RX ORDER — IOPAMIDOL 755 MG/ML
75 INJECTION, SOLUTION INTRAVASCULAR ONCE
Status: COMPLETED | OUTPATIENT
Start: 2023-01-01 | End: 2023-01-01

## 2023-01-01 RX ORDER — SIMVASTATIN 20 MG
TABLET ORAL
Qty: 90 TABLET | Refills: 0 | Status: SHIPPED | OUTPATIENT
Start: 2023-01-01 | End: 2023-01-01

## 2023-01-01 RX ORDER — ONDANSETRON 2 MG/ML
4 INJECTION INTRAMUSCULAR; INTRAVENOUS EVERY 6 HOURS PRN
Status: DISCONTINUED | OUTPATIENT
Start: 2023-01-01 | End: 2023-01-01 | Stop reason: HOSPADM

## 2023-01-01 RX ORDER — ACETAMINOPHEN 325 MG/1
650 TABLET ORAL EVERY 6 HOURS PRN
Status: DISCONTINUED | OUTPATIENT
Start: 2023-01-01 | End: 2023-01-01 | Stop reason: HOSPADM

## 2023-01-01 RX ORDER — OLANZAPINE 5 MG/1
5 TABLET ORAL EVERY 6 HOURS PRN
Qty: 30 TABLET | Refills: 3 | Status: SHIPPED | OUTPATIENT
Start: 2023-01-01

## 2023-01-01 RX ORDER — ONDANSETRON 4 MG/1
4 TABLET, ORALLY DISINTEGRATING ORAL EVERY 6 HOURS PRN
Status: DISCONTINUED | OUTPATIENT
Start: 2023-01-01 | End: 2023-01-01 | Stop reason: HOSPADM

## 2023-01-01 RX ORDER — MIRTAZAPINE 15 MG/1
15 TABLET, FILM COATED ORAL AT BEDTIME
Qty: 30 TABLET | Refills: 0 | Status: SHIPPED | OUTPATIENT
Start: 2023-01-01

## 2023-01-01 RX ORDER — HYDROCHLOROTHIAZIDE 25 MG/1
25 TABLET ORAL DAILY
Status: DISCONTINUED | OUTPATIENT
Start: 2023-01-01 | End: 2023-01-01 | Stop reason: HOSPADM

## 2023-01-01 RX ORDER — HYDRALAZINE HYDROCHLORIDE 20 MG/ML
10 INJECTION INTRAMUSCULAR; INTRAVENOUS EVERY 4 HOURS PRN
Status: DISCONTINUED | OUTPATIENT
Start: 2023-01-01 | End: 2023-01-01 | Stop reason: HOSPADM

## 2023-01-01 RX ORDER — MIRTAZAPINE 15 MG/1
15 TABLET, FILM COATED ORAL AT BEDTIME
Status: DISCONTINUED | OUTPATIENT
Start: 2023-01-01 | End: 2023-01-01 | Stop reason: HOSPADM

## 2023-01-01 RX ORDER — ENALAPRIL MALEATE AND HYDROCHLOROTHIAZIDE 10; 25 MG/1; MG/1
TABLET ORAL DAILY
Status: DISCONTINUED | OUTPATIENT
Start: 2023-01-01 | End: 2023-01-01

## 2023-01-01 RX ORDER — DEXAMETHASONE SODIUM PHOSPHATE 10 MG/ML
10 INJECTION, SOLUTION INTRAMUSCULAR; INTRAVENOUS ONCE
Status: COMPLETED | OUTPATIENT
Start: 2023-01-01 | End: 2023-01-01

## 2023-01-01 RX ORDER — LIDOCAINE 40 MG/G
CREAM TOPICAL
Status: DISCONTINUED | OUTPATIENT
Start: 2023-01-01 | End: 2023-01-01 | Stop reason: HOSPADM

## 2023-01-01 RX ORDER — OLANZAPINE 5 MG/1
5 TABLET ORAL EVERY 6 HOURS PRN
Status: DISCONTINUED | OUTPATIENT
Start: 2023-01-01 | End: 2023-01-01 | Stop reason: HOSPADM

## 2023-01-01 RX ADMIN — DEXAMETHASONE 4 MG: 2 TABLET ORAL at 23:12

## 2023-01-01 RX ADMIN — LEVETIRACETAM 1000 MG: 100 INJECTION, SOLUTION INTRAVENOUS at 15:58

## 2023-01-01 RX ADMIN — DEXAMETHASONE 4 MG: 2 TABLET ORAL at 11:58

## 2023-01-01 RX ADMIN — IOPAMIDOL 67 ML: 755 INJECTION, SOLUTION INTRAVENOUS at 17:14

## 2023-01-01 RX ADMIN — DEXAMETHASONE SODIUM PHOSPHATE 10 MG: 10 INJECTION, SOLUTION INTRAMUSCULAR; INTRAVENOUS at 15:56

## 2023-01-01 RX ADMIN — ENALAPRIL MALEATE 10 MG: 5 TABLET ORAL at 20:08

## 2023-01-01 RX ADMIN — HYDROCHLOROTHIAZIDE 25 MG: 25 TABLET ORAL at 09:10

## 2023-01-01 RX ADMIN — NICOTINE POLACRILEX 2 MG: 2 GUM, CHEWING ORAL at 13:01

## 2023-01-01 RX ADMIN — DEXAMETHASONE 4 MG: 2 TABLET ORAL at 05:45

## 2023-01-01 RX ADMIN — MAGNESIUM SULFATE HEPTAHYDRATE 4 G: 80 INJECTION, SOLUTION INTRAVENOUS at 22:21

## 2023-01-01 RX ADMIN — IOPAMIDOL 75 ML: 755 INJECTION, SOLUTION INTRAVENOUS at 15:36

## 2023-01-01 RX ADMIN — NICOTINE 1 PATCH: 14 PATCH, EXTENDED RELEASE TRANSDERMAL at 11:57

## 2023-01-01 RX ADMIN — GADOBUTROL 5 ML: 604.72 INJECTION INTRAVENOUS at 18:45

## 2023-01-01 RX ADMIN — ENALAPRIL MALEATE 10 MG: 5 TABLET ORAL at 09:10

## 2023-01-01 RX ADMIN — LORAZEPAM 1 MG: 2 INJECTION INTRAMUSCULAR; INTRAVENOUS at 18:10

## 2023-01-01 RX ADMIN — ENOXAPARIN SODIUM 40 MG: 40 INJECTION SUBCUTANEOUS at 20:08

## 2023-01-01 RX ADMIN — IOPAMIDOL 50 ML: 755 INJECTION, SOLUTION INTRAVENOUS at 15:38

## 2023-01-01 RX ADMIN — HYDROCHLOROTHIAZIDE 25 MG: 25 TABLET ORAL at 20:08

## 2023-01-01 RX ADMIN — LEVETIRACETAM 500 MG: 100 INJECTION, SOLUTION INTRAVENOUS at 04:01

## 2023-01-01 RX ADMIN — SIMVASTATIN 20 MG: 10 TABLET, FILM COATED ORAL at 20:08

## 2023-01-01 ASSESSMENT — ENCOUNTER SYMPTOMS
BREAST MASS: 0
PARESTHESIAS: 0
HEARTBURN: 0
HEMATURIA: 0
MYALGIAS: 1
WEAKNESS: 1
EYE PAIN: 0
SHORTNESS OF BREATH: 1
MYALGIAS: 1
FEVER: 0
HEARTBURN: 0
NAUSEA: 0
ABDOMINAL PAIN: 0
EYE PAIN: 0
PALPITATIONS: 1
CHILLS: 0
SORE THROAT: 0
CHILLS: 0
HEADACHES: 0
FEVER: 0
DIARRHEA: 0
NERVOUS/ANXIOUS: 0
ARTHRALGIAS: 1
NERVOUS/ANXIOUS: 0
HEMATOCHEZIA: 0
NAUSEA: 0
HEADACHES: 0
CONSTIPATION: 0
FREQUENCY: 1
ARTHRALGIAS: 1
HEMATURIA: 0
DIZZINESS: 0
DYSURIA: 0
BREAST MASS: 0
SORE THROAT: 0
JOINT SWELLING: 0
PALPITATIONS: 1
PARESTHESIAS: 0
SHORTNESS OF BREATH: 1
WEAKNESS: 1
HEMATOCHEZIA: 0
CONSTIPATION: 0
ABDOMINAL PAIN: 0
JOINT SWELLING: 0
DIARRHEA: 0
DIZZINESS: 0
COUGH: 0
FREQUENCY: 1
COUGH: 0
DYSURIA: 0

## 2023-01-01 ASSESSMENT — ACTIVITIES OF DAILY LIVING (ADL)
DRESSING/BATHING: DRESSING DIFFICULTY, ASSISTANCE 1 PERSON
DRESS: 1-->ASSISTANCE (EQUIPMENT/PERSON) NEEDED
DRESSING/BATHING_DIFFICULTY: YES
ADLS_ACUITY_SCORE: 35
TOILETING: 1-->ASSISTANCE (EQUIPMENT/PERSON) NEEDED (NOT DEVELOPMENTALLY APPROPRIATE)
TOILETING_ASSISTANCE: TOILETING DIFFICULTY, ASSISTANCE 1 PERSON
ADLS_ACUITY_SCORE: 35
FALL_HISTORY_WITHIN_LAST_SIX_MONTHS: NO
DOING_ERRANDS_INDEPENDENTLY_DIFFICULTY: NO
TOILETING: 1-->ASSISTANCE (EQUIPMENT/PERSON) NEEDED
VISION_MANAGEMENT: WEAR GLASSES
BATHING: 1-->ASSISTANCE NEEDED
TOILETING_ISSUES: YES
CHANGE_IN_FUNCTIONAL_STATUS_SINCE_ONSET_OF_CURRENT_ILLNESS/INJURY: YES
DRESS: 1-->ASSISTANCE (EQUIPMENT/PERSON) NEEDED (NOT DEVELOPMENTALLY APPROPRIATE)
CONCENTRATING,_REMEMBERING_OR_MAKING_DECISIONS_DIFFICULTY: YES
ADLS_ACUITY_SCORE: 46
DRESSING/BATHING_MANAGEMENT: NEED ASSISTANCE
ADLS_ACUITY_SCORE: 46
WALKING_OR_CLIMBING_STAIRS_DIFFICULTY: NO
CURRENT_FUNCTION: NO ASSISTANCE NEEDED
ADLS_ACUITY_SCORE: 46
CURRENT_FUNCTION: NO ASSISTANCE NEEDED
ADLS_ACUITY_SCORE: 46
ADLS_ACUITY_SCORE: 46
ADLS_ACUITY_SCORE: 36
ADLS_ACUITY_SCORE: 35
ADLS_ACUITY_SCORE: 46
DIFFICULTY_EATING/SWALLOWING: NO
ADLS_ACUITY_SCORE: 46
ADLS_ACUITY_SCORE: 36
DEPENDENT_IADLS:: INDEPENDENT
WEAR_GLASSES_OR_BLIND: YES

## 2023-01-13 NOTE — TELEPHONE ENCOUNTER
Routing refill request to provider for review/approval because:  Labs not current:  Lipids > year    Kristofer Restrepo RN

## 2023-01-13 NOTE — TELEPHONE ENCOUNTER
Routing refill request to provider for review/approval because:  Patient needs to be seen because it has been more than 1 year since last office visit.    Kristofer Restrepo RN

## 2023-01-23 PROBLEM — I10 BENIGN ESSENTIAL HYPERTENSION: Status: ACTIVE | Noted: 2020-11-06

## 2023-01-23 PROBLEM — E78.5 HYPERLIPIDEMIA LDL GOAL <130: Status: ACTIVE | Noted: 2020-11-06

## 2023-01-30 NOTE — PROGRESS NOTES
"SUBJECTIVE:   Jordyn is a 82 year old who presents for Preventive Visit.    Patient has been advised of split billing requirements and indicates understanding: Yes  Are you in the first 12 months of your Medicare coverage?  No    Healthy Habits:     In general, how would you rate your overall health?  Good    Frequency of exercise:  6-7 days/week    Duration of exercise:  30-45 minutes    Do you usually eat at least 4 servings of fruit and vegetables a day, include whole grains    & fiber and avoid regularly eating high fat or \"junk\" foods?  Yes    Taking medications regularly:  Yes    Medication side effects:  None    Ability to successfully perform activities of daily living:  No assistance needed    Home Safety:  No safety concerns identified    Hearing Impairment:  Feel that people are mumbling or not speaking clearly, need to ask people to speak up or repeat themselves and find that men's voices are easier to understand than woman's    In the past 6 months, have you been bothered by leaking of urine?  No    In general, how would you rate your overall mental or emotional health?  Good      PHQ-2 Total Score: 0    Additional concerns today:  No      Have you ever done Advance Care Planning? (For example, a Health Directive, POLST, or a discussion with a medical provider or your loved ones about your wishes): Yes, advance care planning is on file.    Fall risk  Fallen 2 or more times in the past year?: No  Any fall with injury in the past year?: No    Cognitive Screening   1) Repeat 3 items (Leader, Season, Table)    2) Clock draw: ABNORMAL Set the wrong time- it was set for 12:00  3) 3 item recall: Recalls 3 objects  Results: 3 items recalled: COGNITIVE IMPAIRMENT LESS LIKELY    Mini-CogTM Copyright CECILLE Condon. Licensed by the author for use in [employer]; reprinted with permission ([email or web address]). All rights reserved.      Do you have sleep apnea, excessive snoring or daytime drowsiness?: no     "   Social History     Tobacco Use     Smoking status: Every Day     Packs/day: 0.50     Types: Cigarettes     Smokeless tobacco: Never     Tobacco comments:     0.5-1.0 pack per day   Substance Use Topics     Alcohol use: Yes     Comment: Occasional         Alcohol Use 1/29/2023   Prescreen: >3 drinks/day or >7 drinks/week? No       She has had some intermittent lightheadedness.  She started taking just 1/2 tablet of her blood pressure medication instead of a full tablet and is feeling better.    Current providers sharing in care for this patient include:tPatient Care Team:  Erica Phillips MD as PCP - General (Family Medicine)  Erica Phillips MD as Assigned PCP    The following health maintenance items are reviewed in Epic and correct as of today:  Health Maintenance   Topic Date Due     ZOSTER IMMUNIZATION (3 of 3) 12/26/2022     MEDICARE ANNUAL WELLNESS VISIT  01/30/2024     ANNUAL REVIEW OF HM ORDERS  01/30/2024     FALL RISK ASSESSMENT  01/30/2024     ADVANCE CARE PLANNING  01/30/2028     DTAP/TDAP/TD IMMUNIZATION (2 - Td or Tdap) 01/30/2033     DEXA  01/11/2036     PHQ-2 (once per calendar year)  Completed     INFLUENZA VACCINE  Completed     Pneumococcal Vaccine: 65+ Years  Completed     COVID-19 Vaccine  Completed     IPV IMMUNIZATION  Aged Out     MENINGITIS IMMUNIZATION  Aged Out     Mammogram Screening - Patient over age 75, has elected to discontinue screenings.  Pertinent mammograms are reviewed under the imaging tab.    Review of Systems   Constitutional: Negative for chills and fever.   HENT: Positive for congestion and hearing loss. Negative for ear pain and sore throat.    Eyes: Negative for pain and visual disturbance.   Respiratory: Positive for shortness of breath. Negative for cough.    Cardiovascular: Positive for chest pain and palpitations. Negative for peripheral edema.   Gastrointestinal: Negative for abdominal pain, constipation, diarrhea, heartburn, hematochezia and  "nausea.   Breasts:  Negative for tenderness, breast mass and discharge.   Genitourinary: Positive for frequency and urgency. Negative for dysuria, genital sores, hematuria, pelvic pain, vaginal bleeding and vaginal discharge.   Musculoskeletal: Positive for arthralgias and myalgias. Negative for joint swelling.   Skin: Negative for rash.   Neurological: Positive for weakness. Negative for dizziness, headaches and paresthesias.   Psychiatric/Behavioral: Negative for mood changes. The patient is not nervous/anxious.      Patient was asked about the \"positives\" on her review of systems but states that there is nothing that she feels about she needs to talk about.    OBJECTIVE:   /74   Pulse 105   Temp 98.9  F (37.2  C) (Tympanic)   Resp 16   Ht 1.62 m (5' 3.78\")   Wt 55.5 kg (122 lb 4 oz)   LMP  (LMP Unknown)   SpO2 98%   BMI 21.13 kg/m   Estimated body mass index is 21.13 kg/m  as calculated from the following:    Height as of this encounter: 1.62 m (5' 3.78\").    Weight as of this encounter: 55.5 kg (122 lb 4 oz).  Physical Exam  Objective:  Vital signs reviewed and stable  General: No acute distress  Psych: Appropriate affect  HEENT: moist mucous membranes, pupils equal, round, reactive to light and accomodation, tympanic membranes are pearly grey bilaterally  Lymph: no cervical or supraclavicular lymphadenopathy  Cardiovascular: regular rate and rhythm with no murmur  Pulmonary: clear to auscultation bilaterally with no wheeze  Abdomen: soft, non tender, non distended with normo-active bowel sounds  Extremities: warm and well perfused with no edema  Skin: warm and dry with no rash      ASSESSMENT / PLAN:   1. Encounter for Medicare annual wellness exam  - TD PRESERV FREE, IM (7+ YRS) (DECAVAC/TENIVAC)    2. Essential hypertension  She will have the option to take a full tablet but can certainly continue taking half tablet.  Recommended that she check her blood pressure occasionally.  - " enalapril-hydrochlorothiazide (VASERETIC) 10-25 MG tablet; TAKE ONE TABLET BY MOUTH DAILY  Dispense: 90 tablet; Refill: 3  - BASIC METABOLIC PANEL; Future  - BASIC METABOLIC PANEL    3. Hyperlipidemia LDL goal <130  Refill sent to pharmacy  - simvastatin (ZOCOR) 20 MG tablet; Take 1 tablet (20 mg) by mouth daily  Dispense: 90 tablet; Refill: 3  - Lipid panel reflex to direct LDL Non-fasting; Future  - Lipid panel reflex to direct LDL Non-fasting    4. Post-menopausal  - DX Hip/Pelvis/Spine; Future      Patient has been advised of split billing requirements and indicates understanding: Yes      COUNSELING:  Reviewed preventive health counseling, as reflected in patient instructions        She reports that she has been smoking cigarettes, cigarettes, and cigarettes. She has been smoking an average of .5 packs per day. She has never used smokeless tobacco.  Nicotine/Tobacco Cessation Plan:   Information offered: Patient not interested at this time      Appropriate preventive services were discussed with this patient, including applicable screening as appropriate for cardiovascular disease, diabetes, osteopenia/osteoporosis, and glaucoma.  As appropriate for age/gender, discussed screening for colorectal cancer, prostate cancer, breast cancer, and cervical cancer. Checklist reviewing preventive services available has been given to the patient.    Reviewed patients plan of care and provided an AVS. The Basic Care Plan (routine screening as documented in Health Maintenance) for Jordyn meets the Care Plan requirement. This Care Plan has been established and reviewed with the Patient.          Erica Phillips MD  [facility]    Identified Health Risks:

## 2023-01-30 NOTE — PATIENT INSTRUCTIONS
Patient Education   Personalized Prevention Plan  You are due for the preventive services outlined below.  Your care team is available to assist you in scheduling these services.  If you have already completed any of these items, please share that information with your care team to update in your medical record.  Health Maintenance Due   Topic Date Due     ANNUAL REVIEW OF HM ORDERS  Never done     Diptheria Tetanus Pertussis (DTAP/TDAP/TD) Vaccine (1 - Tdap) Never done     Zoster (Shingles) Vaccine (3 of 3) 12/26/2022

## 2023-03-31 NOTE — TELEPHONE ENCOUNTER
Call placed to Katlyn  Relayed Dr. Phillips message    Katlyn verbalized understanding  Will contact insurance to determine options for getting patient a sooner appointment   Will call back if they can get a sooner appointment outside of [facility]   No further questions/concerns    Kristofer Restrepo RN

## 2023-03-31 NOTE — TELEPHONE ENCOUNTER
Call from patient's daughter Jessica transferred to author  No consent to communicate noted - took information - consent to communicate with sibling Kaltyn Lopez states that Katlyn and her have communicated about their concern regarding patient   States patient is not aware of their concern at this time     Jessica concerned about patient's speech changes  States they noticed the speech changes in January and states the changes have progressed slowly     Patient is needing to take more time to respond to questions  Also, needing more time to find her words and get the words out   Will pause for an extended period of time between sentences   Will occasionally use the wrong word - though, daughter states that is normal for patient   Denies slurred speech     Patient had a CVA in her 40's   Denies facial droop  Denies numbness/tingling/weakness in upper or lower extremities     Looking for a Neurology referral   Concerned about Dementia as their family has a strong family history     Kristofer Restrepo RN

## 2023-03-31 NOTE — TELEPHONE ENCOUNTER
I placed a referral to neurology.  Unfortunately the neurologist through [facility] are quite booked out (about 6 months).  They could certainly contact insurance and see what other neurologist they could see as well.  I have had some good luck having people get into see neuro and neurology in 6 to 8 weeks.    Thanks    EB

## 2023-05-09 NOTE — PROGRESS NOTES
Assessment/Plan:    Jordyn Miller is a 82 year old female presenting for:    Osteopenia, unspecified location  Patient is osteopenia.  Advised calcium and vitamin D.  Advise following up in 2 years.  Advised weightbearing activity.  Could even consider following up with bone density scan in 1 year if she would like.      There are no discontinued medications.    I personally spent over 20 minutes performing care related to this patient on the day of the patient visit.  This includes chart review, precharting, talking with/ examining the patient as well as completing after visit documentation.      Chief Complaint:  Follow Up        Subjective:   Jordyn Miller is a very pleasant 82-year-old female who presents to the clinic today with her  for concerns over her DEXA scan.  Patient had a bone density scan about 2 months ago.  She states that she never had the results.    Results showed osteopenia with a 10-year probability of major fracture of 6.6% and hip fracture 2.9%.  I had sent the patient a [employer] message indicating this and that she almost qualifies for Fosamax but I do not think she needs to start it quite yet.  Would recommend repeating the bone density scan in 2 years.    This was read over her Dctiot a few days after I sent the message.  Patient states that her daughter read it but did not share the information with her.  Patient was frustrated because she thought that I did not get a hold of her but was able to understand that I did.    12 point review of systems completed and negative except for what has been described above    History   Smoking Status     Every Day     Packs/day: 0.50     Types: Cigarettes, Cigarettes, Cigarettes   Smokeless Tobacco     Never     Comment: 0.5-1.0 pack per day         Current Outpatient Medications:      docusate sodium (COLACE) 100 MG capsule, Take 2 capsules (200 mg) by mouth 2 times daily as needed for constipation, Disp: 100 capsule, Rfl: 0      "enalapril-hydrochlorothiazide (VASERETIC) 10-25 MG tablet, TAKE ONE TABLET BY MOUTH DAILY, Disp: 90 tablet, Rfl: 3     simvastatin (ZOCOR) 20 MG tablet, Take 1 tablet (20 mg) by mouth daily, Disp: 90 tablet, Rfl: 3      Objective:  Vitals:    05/09/23 1409   BP: 122/64   Pulse: 94   Resp: 20   Temp: 97.9  F (36.6  C)   TempSrc: Tympanic   SpO2: 95%   Weight: 53.5 kg (118 lb)   Height: 1.62 m (5' 3.78\")       Body mass index is 20.39 kg/m .    Vital signs reviewed and stable  General: No acute distress  Psych: Appropriate affect  HEENT: moist mucous membranes, pupils equal, round, reactive to light and accomodation, tympanic membranes are pearly grey bilaterally  Lymph: no cervical or supraclavicular lymphadenopathy  Cardiovascular: regular rate and rhythm with no murmur  Pulmonary: clear to auscultation bilaterally with no wheeze  Abdomen: soft, non tender, non distended with normo-active bowel sounds  Extremities: warm and well perfused with no edema  Skin: warm and dry with no rash         This note has been dictated and transcribed using voice recognition software.   Any errors in transcription are unintentional and inherent to the software.  Answers for HPI/ROS submitted by the patient on 5/4/2023  What is the reason for your visit today? : Osteoporosis  How many servings of fruits and vegetables do you eat daily?: 0-1  On average, how many sweetened beverages do you drink each day (Examples: soda, juice, sweet tea, etc.  Do NOT count diet or artificially sweetened beverages)?: 0  How many days a week do you exercise enough to make your heart beat faster?: 3 or less  How many days per week do you miss taking your medication?: 0      "

## 2023-06-22 PROBLEM — R41.82 ALTERED MENTAL STATUS, UNSPECIFIED ALTERED MENTAL STATUS TYPE: Status: ACTIVE | Noted: 2023-01-01

## 2023-06-22 PROBLEM — H53.461 HOMONYMOUS HEMIANOPIA, RIGHT: Status: ACTIVE | Noted: 2023-01-01

## 2023-06-22 PROBLEM — R90.0 INTRACRANIAL MASS: Status: ACTIVE | Noted: 2023-01-01

## 2023-06-22 NOTE — ED NOTES
Bed: JNED-I  Expected date: 6/22/23  Expected time: 2:57 PM  Means of arrival: Ambulance  Comments:  [facility] 83 yo altered after nap- uneven pupils

## 2023-06-22 NOTE — ED PROVIDER NOTES
EMERGENCY DEPARTMENT ENCOUNTER      NAME: Jordyn Miller  AGE: 82 year old female  YOB: 1940  MRN: [id number]  EVALUATION DATE & TIME: 6/22/2023  3:05 PM    PCP: Erica Phillips    ED PROVIDER: Niecy Leach MD    Chief Complaint   Patient presents with     Altered Mental Status     Stroke Symptoms         FINAL IMPRESSION:  1. Intracranial mass    2. Altered mental status, unspecified altered mental status type    3. Homonymous hemianopia, right          ED COURSE & MEDICAL DECISION MAKING:    Pertinent Labs & Imaging studies reviewed. (See chart for details)  82 year old female with history of HLD, HTN who presents to the Emergency Department for evaluation of infusion after she was last seen normal yesterday.  On examination patient is confused with slight dysarthria, aphasia/loss of fluency in her speech and a right-sided homonymous hemianopsia as well as pupillary irregularity.  Concern for CVA, TIA, seizure, intracranial mass lesion, electrolyte abnormality.    Patient initially seen evaluated by myself upon EMS arrival.  Stroke alert activated, tier 2 based on time of onset.  Patient was expedited for neuroimaging.  CT of the head independently interpreted by myself revealing at least 2 intracranial mass lesions with surrounding vasogenic edema.  I spoke with neuroradiology, stroke neurology who agreed.  Stroke code D escalated.  Neurosurgery was consulted.  Agrees with plan for Keppra, Decadron, MRI and admission.  MRI ultimately showing 2 enhancing extra-axial masses noted on the previous head CT in the right frontal and left parietal lobe presumably representing metastatic disease.  There is also a 3 mm area in the right frontoparietal junction also likely representing an additional metastasis.  The CT of the chest abdomen pelvis was obtained showing spiculated pulmonary nodules concerning for primary lung cancer as well as adrenal nodule and hepatic lesion all concerning for  "metastasis.  EKG and laboratory work-up obtained and unremarkable.  Findings were discussed with patient and daughter at bedside.  Will be admitted for further management.      ED Course as of 06/22/23 2232   Thu Jun 22, 2023   1502 I met with the patient, obtained history, performed an initial exam, and discussed options and plan for diagnostics and treatment here in the ED.     1512 Called  and asked what the he has been seeing at home with the patient.    1512 Spoke w Micah, s/o.  States \"everything is fine. She's under stress because we are selling the house and have multiple showings and she wants everything to be perfect.  I think her mind is driving 90mph in a 40.\"  Slightly confused upon waking this am.  Normal before bed yest.  Took a nap, upon waking things didn't make sense to her.  S/o thinks confused, slow.  S/o doesn't know of previous stroke, but only together since 2012.     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  New R frontal, L parietal mets.   1552 Spoke to NP Tiny neurosurg. Admit neurotele, rad on to see on admit.    1614 Daughter at bedside   1624 Updated the patient about family about imaging and lab results. We discussed plans for admission.   1755 Spoke to hospitalist Dr. Rahman.       Medical Decision Making    History:    Supplemental history from: Documented in chart, if applicable, Family Member/Significant Other and EMS    External Record(s) reviewed: Documented in chart, if applicable.    Work Up:    Chart documentation includes differential considered and any EKGs or imaging independently interpreted by provider, where specified.    In additional to work up documented, I considered the following work up: See MDM    External consultation:    Discussion of management with " another provider: Neuroradiology, stroke neurology, neurosurgery, hospitalist    Complicating factors:    Care impacted by chronic illness: Hyperlipidemia and Hypertension    Care affected by social determinants of health: Access to care    Disposition considerations: Admit.        At the conclusion of the encounter I discussed the results of all of the tests and the disposition. The questions were answered. The patient or family acknowledged understanding and was agreeable with the care plan.    CRITICAL CARE:  Critical Care  Performed by: Niecy Leach MD  Authorized by: Niecy Leach MD     Total critical care time: 78 minutes  Criticalcare time was exclusive of separately billable procedures and treating other patients.  Critical care was necessary to treat or prevent imminent or life-threatening deterioration of the following conditions: New intracranial mass lesion  Critical care was time spent personally by me on the following activities: development of treatment plan with patient or surrogate, discussions with consultants, examination of patient, evaluation of patient's response totreatment, obtaining history from patient or surrogate, ordering and performing treatments and interventions, ordering and review of laboratory studies, ordering and review of radiographic studies and re-evaluation ofpatient's condition, this excludes any separately billable procedures.      MEDICATIONS GIVEN IN THE EMERGENCY:  Medications   lidocaine 1 % 0.1-1 mL (has no administration in time range)   lidocaine (LMX4) cream (has no administration in time range)   sodium chloride (PF) 0.9% PF flush 3 mL (3 mLs Intracatheter $Given 6/22/23 2009)   sodium chloride (PF) 0.9% PF flush 3 mL (has no administration in time range)   melatonin tablet 1 mg (has no administration in time range)   enoxaparin ANTICOAGULANT (LOVENOX) injection 40 mg (40 mg Subcutaneous $Given 6/22/23 2008)   acetaminophen (TYLENOL) tablet 650 mg (has no  administration in time range)     Or   acetaminophen (TYLENOL) Suppository 650 mg (has no administration in time range)   senna-docusate (SENOKOT-S/PERICOLACE) 8.6-50 MG per tablet 1 tablet (has no administration in time range)     Or   senna-docusate (SENOKOT-S/PERICOLACE) 8.6-50 MG per tablet 2 tablet (has no administration in time range)   ondansetron (ZOFRAN ODT) ODT tab 4 mg (has no administration in time range)     Or   ondansetron (ZOFRAN) injection 4 mg (has no administration in time range)   levETIRAcetam (KEPPRA) 500 mg in sodium chloride 0.9 % 100 mL intermittent infusion (has no administration in time range)   dexamethasone (DECADRON) tablet 4 mg (has no administration in time range)   hydrALAZINE (APRESOLINE) injection 10 mg (has no administration in time range)   simvastatin (ZOCOR) tablet 20 mg (20 mg Oral $Given 6/22/23 2008)   enalapril (VASOTEC) tablet 10 mg (10 mg Oral $Given 6/22/23 2008)     And   hydrochlorothiazide (HYDRODIURIL) tablet 25 mg (25 mg Oral $Given 6/22/23 2008)   magnesium sulfate 4 g in 50 mL sterile water intermittent infusion (4 g Intravenous $New Bag 6/22/23 2221)   iopamidol (ISOVUE-370) solution 75 mL (75 mLs Intravenous $Given 6/22/23 1536)   iopamidol (ISOVUE-370) solution 50 mL (50 mLs Intravenous $Given 6/22/23 1538)   levETIRAcetam (KEPPRA) 1,000 mg in sodium chloride 0.9 % 100 mL intermittent infusion (0 mg Intravenous Stopped 6/22/23 1629)   dexamethasone PF (DECADRON) injection 10 mg (10 mg Intravenous $Given 6/22/23 1556)   iopamidol (ISOVUE-370) solution 67 mL (67 mLs Intravenous $Given 6/22/23 1714)   gadobutrol (GADAVIST) injection 5 mL (5 mLs Intravenous $Given 6/22/23 1845)   LORazepam (ATIVAN) injection 1 mg (1 mg Intravenous Not Given 6/22/23 1911)   LORazepam (ATIVAN) injection 1 mg (1 mg Intravenous $Given 6/22/23 1810)       NEW PRESCRIPTIONS STARTED AT TODAY'S ER VISIT  New Prescriptions    No medications on file       "    =================================================================    HPI    Patient information was obtained from: EMS and patient     Use of Intrepreter: N/A        Jordyn Miller is a 82 year old female with pertinent medical history of hypertension and hyperlipidemia who presents via EMS for evaluation of stroke symptoms and altered mental status.     Per EMS, the patient's last known wellness was before bed last night. Today, she woke up around 6 AM with had and slurred delay speech. She later took a nap and woke up with confusion. Blood sugar was 118.     Per Patient, the patient  called EMS because she was confuse and acting strange. She reports that she was confuse and did not know where she was. Denies any pain. She notes that her vision has been worse then normal. History of cataract surgery bilateral eyes. She has had history of stroke in the past, no baseline deficits from the stroke. The patient states that she \"join the mail for being 110 percent\".      PAST MEDICAL HISTORY:  Past Medical History:   Diagnosis Date     Hypertension      Low back pain     Dr Funk in [facility]       PAST SURGICAL HISTORY:  Past Surgical History:   Procedure Laterality Date     ENT SURGERY  6-28-12    Left earlobe repair       CURRENT MEDICATIONS:    Prior to Admission Medications   Prescriptions Last Dose Informant Patient Reported? Taking?   docusate sodium (COLACE) 100 MG capsule Past Week  No Yes   Sig: Take 2 capsules (200 mg) by mouth 2 times daily as needed for constipation   enalapril-hydrochlorothiazide (VASERETIC) 10-25 MG tablet 6/21/2023  No Yes   Sig: TAKE ONE TABLET BY MOUTH DAILY   estradiol (ESTRACE) 1 MG tablet 6/21/2023  Yes Yes   Sig: Take 1 mg by mouth daily   simvastatin (ZOCOR) 20 MG tablet 6/21/2023  No Yes   Sig: Take 1 tablet (20 mg) by mouth daily      Facility-Administered Medications: None       ALLERGIES:  Allergies   Allergen Reactions     Levofloxacin Unknown " "      FAMILY HISTORY:  No family history on file.    SOCIAL HISTORY:  Social History     Tobacco Use     Smoking status: Every Day     Packs/day: 0.50     Types: Cigarettes     Smokeless tobacco: Never     Tobacco comments:     0.5-1.0 pack per day   Substance Use Topics     Alcohol use: Yes     Comment: Occasional     Drug use: No        VITALS:  Patient Vitals for the past 24 hrs:   BP Temp Temp src Pulse Resp SpO2 Height Weight   06/22/23 2006 (!) 145/70 98.2  F (36.8  C) Oral -- -- 93 % -- --   06/22/23 1855 (!) 166/88 -- -- -- -- -- -- --   06/22/23 1630 (!) 148/89 -- -- 82 -- 94 % -- --   06/22/23 1614 (!) 145/84 -- -- 84 -- 95 % -- --   06/22/23 1604 (!) 159/83 -- -- 85 -- 95 % -- --   06/22/23 1545 (!) 163/84 -- -- 86 -- 96 % -- --   06/22/23 1522 (!) 155/74 -- -- 84 18 98 % -- --   06/22/23 1508 (!) 175/88 98.3  F (36.8  C) Oral 85 16 97 % 1.626 m (5' 4\") 54.4 kg (120 lb)       PHYSICAL EXAM    General Appearance: Well-appearing, well-nourished, no acute distress.  Head:  Normocephalic, atraumatic  Eyes:  Left pupil was 3 mm and right was 4-5 mm. Left eye post lenses replacement. No obvious surgical change to right eye, both pupil are reactive.  conjunctiva/corneas clear, EOM's intact  Cardio:  Regular rate and rhythm  Pulm:  No respiratory distress  Abdomen:  Soft, non-tender, non distended,no rebound or guarding.  Extremities: Extremities normally  Skin:  Skin warm, dry, no rashes  Neuro:  Awake, alert and oriented. Follows commands with needing to repeat. 5/5 strength upper and lower extremities. Mild loss fluency with subtle slurring on S. Right sided homonymous hemianopsia.     [employer] Stroke Scale  Exam Interval: Baseline   Score    Level of consciousness: (0)   Alert, keenly responsive    LOC questions: (0)   Answers both questions correctly    LOC commands: (0)   Performs both tasks correctly    Best gaze: (0)   Normal    Visual: (2)   Complete hemianopia    Facial palsy: (0)   " Normal symmetrical movements    Motor arm (left): (0)   No drift    Motor arm (right): (0)   No drift    Motor leg (left): (0)   No drift    Motor leg (right): (0)   No drift    Limb ataxia: (0)   Absent    Sensory: (0)   Normal- no sensory loss    Best language: (1)   Mild to moderate aphasia    Dysarthria: (1)   Mild to moderate dysarthria    Extinction and inattention: (0)   No abnormality        Total Score:  4          RADIOLOGY/LABS:  Reviewed all pertinent imaging. Please see official radiology report. All pertinent labs reviewed and interpreted.    Results for orders placed or performed during the hospital encounter of 06/22/23   CTA Head Neck with Contrast    Impression    IMPRESSION:    HEAD CT:  1. Development of intracranial lesions with a rim of hyperdensity involving the left parietal lobe and right frontal lobe with surrounding edematous change suspicious for intracranial metastases. These could be better evaluated with a dedicated brain MRI   with/without contrast.    HEAD CTA:  1. Patent intracranial vasculature.    2. 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     NECK CTA:  1. Unremarkable neck vasculature.     2. Severe emphysema with partially visualized airspace opacity in the anterior left upper lobe, which warrants further evaluation with dedicated thoracic CT.    CEREBRAL CT PERFUSION:   1. Small volume of perfusion abnormality surrounding the presumed intracranial metastasis likely related to the edematous change.    - - - - - - - - - - - - - - - - - - - - - - - - - - - - - - - - - - - - - - - - - - - - - - - - - - - - - - - - - - - - - - - - - - - - - - - - - - - -   The results above were discussed with Dr. Ellsworth on 6/22/2023 3:54 PM CDT by Dr. Cleveland Topete.  - - - - - - - - - - - - - - - - - - - - - - - - - - - - - - - - - - - - - - - - - - - - - - - - - - - - - - - - - - - - - - - - - - - - - - - - - - - -   CT Head Perfusion w Contrast - For Tier 2 Stroke     Impression    IMPRESSION:    HEAD CT:  1. Development of intracranial lesions with a rim of hyperdensity involving the left parietal lobe and right frontal lobe with surrounding edematous change suspicious for intracranial metastases. These could be better evaluated with a dedicated brain MRI   with/without contrast.    HEAD CTA:  1. Patent intracranial vasculature.    2. 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     NECK CTA:  1. Unremarkable neck vasculature.     2. Severe emphysema with partially visualized airspace opacity in the anterior left upper lobe, which warrants further evaluation with dedicated thoracic CT.    CEREBRAL CT PERFUSION:   1. Small volume of perfusion abnormality surrounding the presumed intracranial metastasis likely related to the edematous change.    - - - - - - - - - - - - - - - - - - - - - - - - - - - - - - - - - - - - - - - - - - - - - - - - - - - - - - - - - - - - - - - - - - - - - - - - - - - -   The results above were discussed with Dr. Ellsworth on 6/22/2023 3:54 PM CDT by Dr. Cleveland Topete.  - - - - - - - - - - - - - - - - - - - - - - - - - - - - - - - - - - - - - - - - - - - - - - - - - - - - - - - - - - - - - - - - - - - - - - - - - - - -   MR Brain w/o & w Contrast    Impression    IMPRESSION:  1.  2 enhancing intra-axial masses as noted on the head CT measuring up to 2.2 cm in the right frontal lobe and 2.9 cm the left parietal lobe. Presumably these represent metastatic disease.  2.  3 mm cortical focus of enhancement at the right frontoparietal junction probably represents an additional metastasis. A tiny subacute infarct could have a similar appearance.  3.  Localized mass effect without herniation or midline shift. No hydrocephalus.  4.  No hemorrhage or acute infarct.  5.  Note that several sequences are degraded by motion artifact and additional small metastases could be obscured.   CT Chest/Abdomen/Pelvis w Contrast    Impression    IMPRESSION:  1.   Spiculated pulmonary nodules in the right lower and left upper lobes, suspicious for primary lung neoplasm.  2.  Possible left adrenal nodule, metastasis is possible.  3.  Indeterminate 1.2 cm right hepatic lesion, could consider attention on follow-up imaging versus PET.   Extra Blue Top Tube   Result Value Ref Range    Hold Specimen JIC    Extra Red Top Tube   Result Value Ref Range    Hold Specimen JIC    Extra Green Top (Lithium Heparin) Tube   Result Value Ref Range    Hold Specimen JIC    Extra Purple Top Tube   Result Value Ref Range    Hold Specimen JIC    Basic metabolic panel   Result Value Ref Range    Sodium 143 136 - 145 mmol/L    Potassium 4.1 3.4 - 5.3 mmol/L    Chloride 104 98 - 107 mmol/L    Carbon Dioxide (CO2) 27 22 - 29 mmol/L    Anion Gap 12 7 - 15 mmol/L    Urea Nitrogen 22.2 8.0 - 23.0 mg/dL    Creatinine 0.99 (H) 0.51 - 0.95 mg/dL    Calcium 10.4 (H) 8.8 - 10.2 mg/dL    Glucose 99 70 - 99 mg/dL    GFR Estimate 57 (L) >60 mL/min/1.73m2   Result Value Ref Range    INR 0.96 0.85 - 1.15   Partial thromboplastin time   Result Value Ref Range    aPTT 41 (H) 22 - 38 Seconds   Result Value Ref Range    Troponin T, High Sensitivity 16 (H) <=14 ng/L   CBC with platelets and differential   Result Value Ref Range    WBC Count 7.7 4.0 - 11.0 10e3/uL    RBC Count 4.19 3.80 - 5.20 10e6/uL    Hemoglobin 13.4 11.7 - 15.7 g/dL    Hematocrit 38.7 35.0 - 47.0 %    MCV 92 78 - 100 fL    MCH 32.0 26.5 - 33.0 pg    MCHC 34.6 31.5 - 36.5 g/dL    RDW 14.3 10.0 - 15.0 %    Platelet Count 214 150 - 450 10e3/uL    % Neutrophils 73 %    % Lymphocytes 16 %    % Monocytes 9 %    % Eosinophils 1 %    % Basophils 1 %    % Immature Granulocytes 0 %    NRBCs per 100 WBC 0 <1 /100    Absolute Neutrophils 5.6 1.6 - 8.3 10e3/uL    Absolute Lymphocytes 1.3 0.8 - 5.3 10e3/uL    Absolute Monocytes 0.7 0.0 - 1.3 10e3/uL    Absolute Eosinophils 0.0 0.0 - 0.7 10e3/uL    Absolute Basophils 0.1 0.0 - 0.2 10e3/uL    Absolute Immature  Granulocytes 0.0 <=0.4 10e3/uL    Absolute NRBCs 0.0 10e3/uL   Glucose by meter   Result Value Ref Range    GLUCOSE BY METER POCT 94 70 - 99 mg/dL   Result Value Ref Range    Magnesium 1.3 (L) 1.7 - 2.3 mg/dL       EKG:  Performed at: 22-JUN-2023 16:L09    Impression: Sinus rhythm    Rate: 83 BPM  Rhythm: Normal Sinus  Axis: 53  NH Interval: 146 ms  QRS Interval: 78 ms  QTc Interval: 413 ms  ST Changes: None  Comparison: No previous ECGs available  I have independently reviewed and interpreted the EKG(s) documented above.    The creation of this record is based on the scribe s observations of the work being performed by Niecy Leach MD and the provider s statements to them. It was created on her behalf by Madhuri Jenkins, a trained medical scribe. This document has been checked and approved by the attending provider.    Niecy Leach MD  Emergency Medicine  [facility] EMERGENCY DEPARTMENT  [facility] [facility] [id number] [phone]  Dept: [phone]       Niecy Leach MD  06/22/23 8153

## 2023-06-22 NOTE — H&P
[facility]    History and Physical - Hospitalist Service       Date of Admission:  6/22/2023    Assessment & Plan      Jordyn Miller is a 82 year old female admitted on 6/22/2023. She has history of hypertension, presented from home with symptoms of confusion and slurred speech. Work up with CT head showed brain mets. CTA chest showed spiculated pulmonary nodule noted over the left lower lobe suspicious for primary lung cancer.    Altered mental status  -CT head shows development of intracranial lesion with rim hyperdensity involving the left parietal lobe and  right frontal lobe with  surrounding edematous change suspicious for intracranial metastasis. CTA Head and neck revealed a small avulsion in the left posterior frontal lobe.   -MRI ordered - pending  -Evaluated by stroke neurologist initially given the presentation. NSG consulted   -Continue Keppra and dex per NSG  -Place on fall and seizure precautions  -Radiation oncology consulted    Likely Primary lung cancer - new diagnosis  -CT chest revealed spiculated pulmonary nodule noted over the left lower lobe suspicious for primary lung cancer.  Also possibly left adrenal nodule, this is possible.  Indeterminate 1.2 cm right hepatic lesion also seen  -IR consulted for possible lung biopsy if patient/family wishes to pursue.  -Palliative consulted as well for goals of care discussion    Hypertension - elevated  -Resume PTA medication  -Hydralazine prn ordered    Hyperlipidemia - PTA statin    HypoMg - replaced as needed.          Diet: Combination Diet Regular Diet Adult    DVT Prophylaxis: Enoxaparin (Lovenox) SQ  Alcantar Catheter: Not present  Lines: None     Cardiac Monitoring: None  Code Status: No CPR- Do NOT Intubate, discussed with patients daughter on admission    Clinically Significant Risk Factors Present on Admission           # Hypercalcemia: Highest Ca = 10.4 mg/dL in last 2 days, will monitor as appropriate        #  Hypertension: Noted on problem list               Disposition Plan      Expected Discharge Date: 06/24/2023                  Megan Vazquez MD  Hospitalist Service  [facility]  Securely message with [employer] (more info)  Text page via [employer] Paging/Directory     ______________________________________________________________________    Chief Complaint   Confusion, slurred speech    History is obtained from the patient family by bedside    History of Present Illness   Jordyn Miller is a 82 year old female with history of hypertension, presented from home with symptoms of confusion and slurred speech.  Patient is confused and unable to provide any history.  Per EMS report to the ED attending and family, patient was at her baseline last night before she went to bed.  She woke up this morning at 6 AM and had some word finding difficulties with slurred speech.  She then went to take a nap, woke up with confusion. ROS limited given patients confusion    Arrival to ED, patient noted to have mildly elevated blood pressure.  Labs unremarkable for mild hypercalcemia.  Unremarkable.  CT head shows development of intracranial lesion with rim hyperdensity involving the left parietal lobe and  right frontal lobe with  surrounding edematous change suspicious for intracranial metastasis. CTA Head and neck revealed a small avulsion in the left posterior frontal lobe.  CT chest reviewed spiculated pulmonary nodule noted over the left lower lobe suspicious for primary lung cancer.  Also possibly left adrenal nodule, this is possible.  Indeterminate 1.2 cm right hepatic lesion also seen.  Initially stroke alert was activated given her presentation, it was then de-escalated once it was ruled out.  Neurosurgery consulted for the ED.  Patient to be admitted for further management.        Past Medical History    Past Medical History:   Diagnosis Date     Hypertension      Low back pain     Dr Funk in [facility]  Ortho       Past Surgical History   Past Surgical History:   Procedure Laterality Date     ENT SURGERY  6-28-12    Left earlobe repair       Prior to Admission Medications   Prior to Admission Medications   Prescriptions Last Dose Informant Patient Reported? Taking?   docusate sodium (COLACE) 100 MG capsule Past Week  No Yes   Sig: Take 2 capsules (200 mg) by mouth 2 times daily as needed for constipation   enalapril-hydrochlorothiazide (VASERETIC) 10-25 MG tablet 6/21/2023  No Yes   Sig: TAKE ONE TABLET BY MOUTH DAILY   estradiol (ESTRACE) 1 MG tablet 6/21/2023  Yes Yes   Sig: Take 1 mg by mouth daily   simvastatin (ZOCOR) 20 MG tablet 6/21/2023  No Yes   Sig: Take 1 tablet (20 mg) by mouth daily      Facility-Administered Medications: None        Review of Systems    The 10 point Review of Systems is limited given patients confusion  Physical Exam   Vital Signs: Temp: 98.2  F (36.8  C) Temp src: Oral BP: (!) 145/70 Pulse: 82   Resp: 18 SpO2: 93 % O2 Device: None (Room air)    Weight: 120 lbs 0 oz    General: Pleasant, elderly female in NAD  HEENT:AT,NC, unequal pupil bilaterally, left small than right, both reactive to light.  CVS:RRR, no edema  RS:CTAB  Abd: Soft, NT,ND  Neurology:Awake, alert, pleasantly confused, strength 5/5 in all extremities, slurry speech. Likely short term memory issues  Psy:Approrpiate affect      Medical Decision Making       75 MINUTES SPENT BY ME on the date of service doing chart review, history, exam, documentation & further activities per the note.      Data     I have personally reviewed the following data over the past 24 hrs:    7.7  \   13.4   / 214     143 104 22.2 /  99   4.1 27 0.99 (H) \       Trop: 16 (H) BNP: N/A       INR:  0.96 PTT:  41 (H)   D-dimer:  N/A Fibrinogen:  N/A

## 2023-06-22 NOTE — PROGRESS NOTES
Called by Dr Leach regarding images results and presentation to the ED today with word finding difficulty, right homonymous hemianopia. Was normal when she went to bed last night. Woke up at 0600 today with symptoms. Ct, CTA available so far and show intracranial lesions with a rim of hyperdensity in the left parietal lobe measuring 1.8 x 1.7 cm and right frontal lobe measuring 1.7 x 1.3 cm with surrounding edematous change suspicious for intracranial metastases. No known cancer history. Keppra and dexamethasone have been ordered by ED provider appropriately.     Recommend  Conversation about goals of care  Rad oncology consultation   MRI brain with and without   HOB greater than 30 degrees  CT chest, abd, pelvis  Keppra  Dexamethasone  Neurology consult    Attending: Dr Trae Franks, [facility] Neurosurgery  O: [phone]    CT abd/chest/pelvis results  IMPRESSION:  1.  Spiculated pulmonary nodules in the right lower and left upper lobes, suspicious for primary lung neoplasm.  2.  Possible left adrenal nodule, metastasis is possible.  3.  Indeterminate 1.2 cm right hepatic lesion, could consider attention on follow-up imaging versus PET.

## 2023-06-22 NOTE — CONSULTS
"  [facility]    Stroke Telephone Note    I was called by Niecy Leach on 06/22/23 regarding patient Jordyn Miller. The patient is a 82 year old female who presents with a R homonymous hemianopia and word finding difficulties and headache. She woke up confused at 6 am but was fine before bed. Speech is a little slurred    BP (!) 145/84   Pulse 84   Temp 98.3  F (36.8  C) (Oral)   Resp 18   Ht 1.626 m (5' 4\")   Wt 54.4 kg (120 lb)   LMP  (LMP Unknown)   SpO2 95%   BMI 20.60 kg/m       Stroke Code Data (for stroke code without tele)  Stroke code activated 06/22/23   1513   Stroke provider first response  06/22/23   1515            Last known normal 06/21/23    (bedtime)        Time of discovery   (or onset of symptoms) 06/22/23   0600   Head CT read by Stroke Neuro Dr/Provider 06/22/23   1525   Was stroke code de-escalated? 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     Intravenous Thrombolysis  Not given due to:   - stroke mimic: suspected neoplasm    Endovascular Treatment  Not initiated due to absence of proximal vessel occlusion    Impression  Dysarthria due to suspected brain mets    Recommendations   - Brain MRI with and without contrast, consult neurosurgery  - Agree Keppra is reasonable    Case discussed with vascular neurology attending Dr. Tate    My recommendations are based on the information provided over the phone by Jordyn Miller's in-person providers. They are not intended to replace the clinical judgment of her in-person providers. I was not requested to personally see or examine the patient at this time.    Apolonia Eng PA-C  Vascular Neurology    To page me or covering stroke neurology team member, click here: [facility]  Choose \"On Call\" tab at " "top, then select \"NEUROLOGY/ALL SITES\" from middle drop-down box, press Enter, then look for \"stroke\" or \"telestroke\" for your site.        "

## 2023-06-22 NOTE — ED TRIAGE NOTES
Patient arrives via [facility].  Pt last well known was last night prior to going to bed per EMS.  Woke up at 6am and c/o slurred speech and a headache.  Later took a nap, woke up with similar symptoms and called EMS around 1400.      . VSS per EMS.     HX: stroke.

## 2023-06-23 NOTE — DISCHARGE SUMMARY
[facility]  Hospitalist Discharge Summary      Date of Admission:  6/22/2023  Date of Discharge:  6/23/2023  3:17 PM  Discharging Provider: Arnold Byrd,   Discharge Service: Hospitalist Service        Follow-ups Needed After Discharge   Follow-up Appointments     Follow-up and recommended labs and tests       Follow up with primary care provider, Erica Phillips, within 7   days for hospital follow- up.    Follow up as directed with radiation oncology and general oncology            Unresulted Labs Ordered in the Past 30 Days of this Admission     Date and Time Order Name Status Description    6/23/2023  2:35 PM Keppra (Levetiracetam) Level In process       These results will be followed up by Hospitalist results pool    Discharge Disposition   Discharged to home  Condition at discharge: Stable      Hospital Course   82-year-old female with history of hypertension presents 6/22/2023 with confusion and slurred speech.  Work-up reveals likely primary lung cancer with brain mets.        Altered mental status: Improved after starting steroids  Brain MRI shows 2 enhancing intra-axial masses 2.2 cm right frontal lobe and and 2.9 cm left parietal lobe.  There is a an additional 3 mm area of enhancement right frontal temporal junction  -- Continue Keppra and dexamethasone after discharge  -- dexamethasone taper ordered per oncology recommendations  -- plan outpatient follow up with general oncology and radiation oncology  -- Appreciate palliative care also seeing patient. Will continue Remeron for sleep after discharge and will continue as needed Zyprexa after discharge for agitation or insomnia.           Likely new diagnosis of metastatic lung cancer.  CT on admission shows spiculated pulmonary nodules, left adrenal nodule and right hepatic lesion.  Brain imaging discussed above  -- patient has deferred lung biopsy   -- Discussed tissue biopsy with IR.  They noted that patient  does have emphysema and is at an increased risk for pneumothorax so lung biopsy is not without risk.  This information was shared with the patient.   -- General oncology and radiation oncology plan outpatient follow up               History of hypertension: Continue home enalapril and hydrochlorothiazide           Hyperlipidemia: Continue home statin           Hypomagnesemia: Resolved with replacement       Consultations This Hospital Stay   RADIATION ONCOLOGY IP CONSULT  INTERVENTIONAL RADIOLOGY ADULT/PEDS IP CONSULT  PALLIATIVE CARE ADULT IP CONSULT  CARE MANAGEMENT / SOCIAL WORK IP CONSULT  HEMATOLOGY & ONCOLOGY IP CONSULT  CARE MANAGEMENT / SOCIAL WORK IP CONSULT  SPIRITUAL HEALTH SERVICES IP CONSULT    Code Status   No CPR- Do NOT Intubate    Time Spent on this Encounter   I, Arnold Byrd DO, personally saw the patient today and spent greater than 30 minutes discharging this patient.       Arnold Byrd DO  [place] [id number]  Phone: [phone]  Fax: [phone]  ______________________________________________________________________       Primary Care Physician   Erica Phillips    Discharge Orders      Reason for your hospital stay    Lung cancer     Activity    Your activity upon discharge: activity as tolerated     Follow-up and recommended labs and tests     Follow up with primary care provider, Erica Phillips, within 7 days for hospital follow- up.    Follow up as directed with radiation oncology and general oncology     Diet    Follow this diet upon discharge: Orders Placed This Encounter      Combination Diet Regular Diet Adult     \    Discharge Medications   Discharge Medication List as of 6/23/2023  2:45 PM      START taking these medications    Details   dexamethasone (DECADRON) 4 MG tablet Take 1 tablet (4 mg) by mouth every 6 hours for 2 days, THEN 1 tablet (4 mg) every 8 hours for 3 days, THEN 0.5 tablets  (2 mg) every 8 hours for 3 days, THEN 0.5 tablets (2 mg) every 12 hours for 3 days, THEN 0.5 tablets (2 mg) daily (with breakfast) for  3 days., Disp-26 tablet, R-0, E-Prescribe      levETIRAcetam (KEPPRA) 500 MG tablet Take 1 tablet (500 mg) by mouth 2 times daily, Disp-60 tablet, R-1, E-Prescribe      mirtazapine (REMERON) 15 MG tablet Take 1 tablet (15 mg) by mouth At Bedtime, Disp-30 tablet, R-0, E-PrescribeFor sleep      OLANZapine (ZYPREXA) 5 MG tablet Take 1 tablet (5 mg) by mouth every 6 hours as needed (insomnia or anxiety/agitation), Disp-30 tablet, R-3, E-Prescribe         CONTINUE these medications which have NOT CHANGED    Details   docusate sodium (COLACE) 100 MG capsule Take 2 capsules (200 mg) by mouth 2 times daily as needed for constipation, Disp-100 capsule, R-0, E-Prescribe      enalapril-hydrochlorothiazide (VASERETIC) 10-25 MG tablet TAKE ONE TABLET BY MOUTH DAILY, Disp-90 tablet, R-3, E-Prescribe      estradiol (ESTRACE) 1 MG tablet Take 1 mg by mouth daily, Historical      simvastatin (ZOCOR) 20 MG tablet Take 1 tablet (20 mg) by mouth daily, Disp-90 tablet, R-3, E-Prescribe           Allergies   Allergies   Allergen Reactions     Levofloxacin Unknown

## 2023-06-23 NOTE — CONSULTS
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 Not interested in biopsy even if would change XRT from whole brain to SRS. No interest in further imaging or systemic therapy at this time.     DIscharge on current dose of dexamethasone. We will handle taper as outpatient.     F/U with us Tuesday 6/27/2023 8:30 with simulation to follow. Have patient come to [facility], first floor.               Subjective:                    HPI:  Jordyn Miller is a 82 year old female presented yesterday with word finding difficulties, right homonymous hemianopia.  Felt herself when went to bed night prior, awoke with sx.  CTA showed left parietal  and right frontal lesions with small left posterior AVM. MRI showed the right frontal lesion to be 2.2cm and the left parietal lesion to be 2.9cm. There is a likely 3mm right frontoparietal met as well. No midline shift or hydrocephalus.     CAP CT shows emphysema right lower lobe 1.5cm  and ligular 1.5cm spiculated nodules.  There are other smaller scatter nodules. There is a well-circumscribed liver lesion 1.2cm as well as left adrenal thickening measuring 1.6cm . No biopsy yet.     Seen by neurosurgery, not a surgical candidate and brain lesion pathology not necessary for diagnosis. Has responded to steroids. On Keppra     DNR        Review of Systems  Word finding and confusion improved on steroids. No HA.     Past History  Past Medical History:   Diagnosis Date     Hypertension      Low back pain     Dr Funk in [facility]     Past Surgical  History:   Procedure Laterality Date     ENT SURGERY  6-28-12    Left earlobe repair     Current Facility-Administered Medications   Medication     acetaminophen (TYLENOL) tablet 650 mg    Or     acetaminophen (TYLENOL) Suppository 650 mg     dexamethasone (DECADRON) tablet 4 mg     enalapril (VASOTEC) tablet 10 mg    And     hydrochlorothiazide (HYDRODIURIL) tablet 25 mg     [Held by provider] enoxaparin ANTICOAGULANT (LOVENOX) injection 40 mg     hydrALAZINE (APRESOLINE) injection 10 mg     levETIRAcetam (KEPPRA) 500 mg in sodium chloride 0.9 % 100 mL intermittent infusion     lidocaine (LMX4) cream     lidocaine 1 % 0.1-1 mL     melatonin tablet 3 mg     mirtazapine (REMERON) tablet 15 mg     nicotine (NICODERM CQ) 14 MG/24HR 24 hr patch 1 patch     nicotine (NICORETTE) gum 2 mg     nicotine Patch in Place     OLANZapine (zyPREXA) tablet 5 mg     ondansetron (ZOFRAN ODT) ODT tab 4 mg    Or     ondansetron (ZOFRAN) injection 4 mg     senna-docusate (SENOKOT-S/PERICOLACE) 8.6-50 MG per tablet 1 tablet    Or     senna-docusate (SENOKOT-S/PERICOLACE) 8.6-50 MG per tablet 2 tablet     sennosides (SENOKOT) tablet 1 tablet     simvastatin (ZOCOR) tablet 20 mg     sodium chloride (PF) 0.9% PF flush 3 mL     sodium chloride (PF) 0.9% PF flush 3 mL     Levofloxacin  Social History     Socioeconomic History     Marital status:      Spouse name: Not on file     Number of children: Not on file     Years of education: Not on file     Highest education level: Not on file   Occupational History     Not on file   Tobacco Use     Smoking status: Every Day     Packs/day: 0.50     Types: Cigarettes     Smokeless tobacco: Never     Tobacco comments:     0.5-1.0 pack per day   Substance and Sexual Activity     Alcohol use: Yes     Comment: Occasional     Drug use: No     Sexual activity: Not on file   Other Topics Concern     Parent/sibling w/ CABG, MI or angioplasty before 65F 55M? Not Asked   Social History Narrative     Not  on file     Social Determinants of Health     Financial Resource Strain: Not on file   Food Insecurity: Not on file   Transportation Needs: Not on file   Physical Activity: Not on file   Stress: Not on file   Social Connections: Not on file   Intimate Partner Violence: Not on file   Housing Stability: Not on file       Objective:     Physical Exam  Alert and cooperative   Word finding difficulty, but answers questions appropriately.   Moves all four extremities EOMI, PER, visual fields not tested   Well perfused.        Lab Results     Results for orders placed or performed in visit on 11/19/21   COMPREHENSIVE METABOLIC PANEL   Result Value Ref Range    Sodium 137 133 - 144 mmol/L    Potassium 4.7 3.4 - 5.3 mmol/L    Chloride 103 94 - 109 mmol/L    Carbon Dioxide (CO2) 29 20 - 32 mmol/L    Anion Gap 5 3 - 14 mmol/L    Urea Nitrogen 26 7 - 30 mg/dL    Creatinine 0.87 0.52 - 1.04 mg/dL    Calcium 9.8 8.5 - 10.1 mg/dL    Glucose 106 (H) 70 - 99 mg/dL    Alkaline Phosphatase 100 40 - 150 U/L    AST 30 0 - 45 U/L    ALT 30 0 - 50 U/L    Protein Total 7.6 6.8 - 8.8 g/dL    Albumin 3.9 3.4 - 5.0 g/dL    Bilirubin Total 0.9 0.2 - 1.3 mg/dL    GFR Estimate 63 >60 mL/min/1.73m2     No results found for this or any previous visit.     Imaging Results    MR Brain w/o & w Contrast    Result Date: 6/22/2023  EXAM: MR BRAIN W/O and W CONTRAST LOCATION: [facility] DATE: 6/22/2023 INDICATION: New mass MET. COMPARISON: Head CT 6/22/2023 CONTRAST: 5 mL Gadavist TECHNIQUE: Routine multiplanar multisequence head MRI without and with intravenous contrast. FINDINGS: No restricted diffusion to indicate an acute infarct. A number of sequences are significantly degraded by motion artifact. Peripherally enhancing intra-axial mass in the right frontal lobe measures 1.8 x 2.0 x 2.2 cm. Heterogeneously enhancing lesion in the left parietal lobe measures approximately 2.9 x 2.0 x 2.0 cm. Moderate adjacent vasogenic edema  with localized mass effect. No other lesions. 3 mm focus of enhancement within the cortex at the right frontoparietal junction. Paranasal sinuses are free from significant disease. Patchy fluid within the left mastoid air cells. Right appear clear. Intraorbital contents are unremarkable. There is mild to moderate generalized prominence of the sulci and ventricles, consistent with  underlying volume loss. Intracranial flow voids are intact. There are scattered foci of T2/FLAIR hyperintensity within the cerebral white matter and central sarah that are nonspecific but probably reflect the sequela of chronic small vessel ischemic disease. Chronic right temporal parietal infarcts.     IMPRESSION: 1.  2 enhancing intra-axial masses as noted on the head CT measuring up to 2.2 cm in the right frontal lobe and 2.9 cm the left parietal lobe. Presumably these represent metastatic disease. 2.  3 mm cortical focus of enhancement at the right frontoparietal junction probably represents an additional metastasis. A tiny subacute infarct could have a similar appearance. 3.  Localized mass effect without herniation or midline shift. No hydrocephalus. 4.  No hemorrhage or acute infarct. 5.  Note that several sequences are degraded by motion artifact and additional small metastases could be obscured.    CT Chest/Abdomen/Pelvis w Contrast    Result Date: 6/22/2023  EXAM: CT CHEST/ABDOMEN/PELVIS W CONTRAST LOCATION: [facility] DATE: 6/22/2023 INDICATION: new intracranial mass x2, eval primary mass tumor COMPARISON: None. TECHNIQUE: CT scan of the chest, abdomen, and pelvis was performed following injection of IV contrast. Multiplanar reformats were obtained. Dose reduction techniques were used. CONTRAST: isovue 370 67ml FINDINGS: LUNGS AND PLEURA: Moderate upper lobe predominant centrilobular emphysema. There is a spiculated nodule in the right lower lobe measuring 1.5 x 1.2 cm (series 4 image 144) as well as a  smaller similar-appearing nodule in the lingula measuring 1.5 x 1.0 cm (series 3 image 143). Scattered smaller pulmonary nodules, including 4 mm right middle lobe nodule (series 4 image 142). Scattered calcified granulomas noted. No pleural effusion. MEDIASTINUM/AXILLAE: No suspicious lymphadenopathy. CORONARY ARTERY CALCIFICATION: None. HEPATOBILIARY: Well-circumscribed hypodense lesion in the superior right hepatic lobe measuring up to 1.2 cm, density greater than simple fluid (series 3 image 115). No additional focal lesions visualized. PANCREAS: Normal. SPLEEN: Normal. ADRENAL GLANDS: Significant thickening of the left adrenal gland with possible nodule in the medial limb measuring up to 1.6 cm (series 3 image 150). KIDNEYS/BLADDER: Left renal cyst, no specific follow-up recommended. No hydronephrosis. Excreted noted in the renal collecting system. Urinary bladder is unremarkable. BOWEL: Colonic diverticulosis without evidence of acute diverticulitis. No obstruction or inflammatory change. Large duodenal diverticulum noted. LYMPH NODES: Normal. VASCULATURE: Moderate calcified atherosclerosis. PELVIC ORGANS: Normal. MUSCULOSKELETAL: No acute bony abnormality or suspicious osseous lesions.     IMPRESSION: 1.  Spiculated pulmonary nodules in the right lower and left upper lobes, suspicious for primary lung neoplasm. 2.  Possible left adrenal nodule, metastasis is possible. 3.  Indeterminate 1.2 cm right hepatic lesion, could consider attention on follow-up imaging versus PET.    CTA Head Neck with Contrast    Result Date: 6/22/2023  EXAM: CTA HEAD NECK W CONTRAST, CT HEAD PERFUSION W CONTRAST LOCATION: [facility] DATE/TIME: 6/22/2023 3:37 PM CDT INDICATION: Weakness. Stroke evaluation. 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 COMPARISON: CT head dated 12/02/2020 CONTRAST: 75 mL Isovue 370, 50 mL Isovue-370 TECHNIQUE: 1) Head and neck CT angiogram with IV contrast.  Noncontrast head CT followed by axial helical CT images of the vessels obtained during the arterial phase of intravenous contrast administration. Axial helical 2D reconstructed images and multiplanar 3D MIP  reconstructed images of the vessels were were generated on a separate workstation by the technologist using source data obtained at the time of image acquisition and reviewed. Dose reduction techniques were used. All stenosis measurements made according  to NASCET criteria unless otherwise specified. 2) Cerebral CT perfusion with IV contrast. Postprocessing with RAPID software used for interpretation.  Dose reduction techniques were used. FINDINGS: NONCONTRAST HEAD CT: INTRACRANIAL CONTENTS: Development of intracranial lesions with a rim of hyperdensity in the left parietal lobe measuring 1.8 x 1.7 cm and right frontal lobe measuring 1.7 x 1.3 cm with surrounding edematous change suspicious for intracranial metastases.  No acute intraparenchymal hemorrhage. Chronic right parietal lobe infarct without CT evidence of acute infarct. Sequelae of mild chronic microangiopathy. Mild cerebral volume loss without hydrocephalus. No extra-axial fluid collections.  Patent basal cisterns. VISUALIZED ORBITS/SINUSES/MASTOIDS: The orbits are unremarkable. The visualized paranasal sinuses and temporal bone structures are well-aerated. BONES/SOFT TISSUES: The calvarium and skull base are unremarkable. HEAD CTA: ANTERIOR CIRCULATION: No stenosis/occlusion or aneurysm. 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 The anterior communicating artery is patent. The posterior communicating arteries are patent. POSTERIOR CIRCULATION: No stenosis/occlusion, aneurysm, or high flow vascular malformation. Right dominant vertebrobasilar circulation. The basilar artery is unremarkable and gives rise to patent superior cerebellar and posterior cerebral arteries. DURAL VENOUS SINUSES: Patent. NECK CTA: RIGHT CAROTID: Normal  course and persist caliber of the common carotid artery. Atherosclerotic disease at the carotid artery bifurcation without stenosis or dissection. LEFT CAROTID: Normal course and persist caliber of the common carotid artery. Atherosclerotic disease at the carotid artery bifurcation without stenosis or dissection. VERTEBRAL ARTERIES: Balance configuration without stenosis or dissection. AORTIC ARCH: Classic three-vessel arch. The origins of the great vessels are unremarkable without significant stenosis. NONVASCULAR STRUCTURES: There are no masses or enlarged lymph nodes in the neck. The submandibular, parotid, and thyroid glands are unremarkable. Multilevel degenerative changes without high-grade spinal canal stenosis or neural foraminal narrowing. No aggressive osseous lesions. Severe emphysema with partially visualized airspace opacity in the anterior left upper lobe, which warrants further evaluation with dedicated thoracic CT. CEREBRAL CT PERFUSION: There is a small volume of perfusion abnormalities seen surrounding the lesions, which is likely related to the vasogenic edema. No vascular perfusion abnormality is identified. RAPID ANALYSIS: CBF<30% volume: 6 mL Tmax>6sec: 3 mL Mismatch volume: -3 mL Mismatch ratio: 0.5     IMPRESSION: HEAD CT: 1. Development of intracranial lesions with a rim of hyperdensity involving the left parietal lobe and right frontal lobe with surrounding edematous change suspicious for intracranial metastases. These could be better evaluated with a dedicated brain MRI  with/without contrast. HEAD CTA: 1. Patent intracranial vasculature. 2. 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 NECK CTA: 1. Unremarkable neck vasculature. 2. Severe emphysema with partially visualized airspace opacity in the anterior left upper lobe, which warrants further evaluation with dedicated thoracic CT. CEREBRAL CT PERFUSION: 1. Small volume of perfusion abnormality surrounding the presumed  intracranial metastasis likely related to the edematous change. - - - - - - - - - - - - - - - - - - - - - - - - - - - - - - - - - - - - - - - - - - - - - - - - - - - - - - - - - - - - - - - - - - - - - - - - - - - - The results above were discussed with Dr. Ellsworth on 6/22/2023 3:54 PM CDT by Dr. Cleveland Topete. - - - - - - - - - - - - - - - - - - - - - - - - - - - - - - - - - - - - - - - - - - - - - - - - - - - - - - - - - - - - - - - - - - - - - - - - - - - -    CT Head Perfusion w Contrast - For Tier 2 Stroke    Result Date: 6/22/2023  EXAM: CTA HEAD NECK W CONTRAST, CT HEAD PERFUSION W CONTRAST LOCATION: [facility] DATE/TIME: 6/22/2023 3:37 PM CDT INDICATION: Weakness. Stroke evaluation. 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 COMPARISON: CT head dated 12/02/2020 CONTRAST: 75 mL Isovue 370, 50 mL Isovue-370 TECHNIQUE: 1) Head and neck CT angiogram with IV contrast. Noncontrast head CT followed by axial helical CT images of the vessels obtained during the arterial phase of intravenous contrast administration. Axial helical 2D reconstructed images and multiplanar 3D MIP  reconstructed images of the vessels were were generated on a separate workstation by the technologist using source data obtained at the time of image acquisition and reviewed. Dose reduction techniques were used. All stenosis measurements made according  to NASCET criteria unless otherwise specified. 2) Cerebral CT perfusion with IV contrast. Postprocessing with RAPID software used for interpretation.  Dose reduction techniques were used. FINDINGS: NONCONTRAST HEAD CT: INTRACRANIAL CONTENTS: Development of intracranial lesions with a rim of hyperdensity in the left parietal lobe measuring 1.8 x 1.7 cm and right frontal lobe measuring 1.7 x 1.3 cm with surrounding edematous change suspicious for intracranial metastases.  No acute intraparenchymal hemorrhage. Chronic right parietal lobe infarct  without CT evidence of acute infarct. Sequelae of mild chronic microangiopathy. Mild cerebral volume loss without hydrocephalus. No extra-axial fluid collections.  Patent basal cisterns. VISUALIZED ORBITS/SINUSES/MASTOIDS: The orbits are unremarkable. The visualized paranasal sinuses and temporal bone structures are well-aerated. BONES/SOFT TISSUES: The calvarium and skull base are unremarkable. HEAD CTA: ANTERIOR CIRCULATION: No stenosis/occlusion or aneurysm. 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 The anterior communicating artery is patent. The posterior communicating arteries are patent. POSTERIOR CIRCULATION: No stenosis/occlusion, aneurysm, or high flow vascular malformation. Right dominant vertebrobasilar circulation. The basilar artery is unremarkable and gives rise to patent superior cerebellar and posterior cerebral arteries. DURAL VENOUS SINUSES: Patent. NECK CTA: RIGHT CAROTID: Normal course and persist caliber of the common carotid artery. Atherosclerotic disease at the carotid artery bifurcation without stenosis or dissection. LEFT CAROTID: Normal course and persist caliber of the common carotid artery. Atherosclerotic disease at the carotid artery bifurcation without stenosis or dissection. VERTEBRAL ARTERIES: Balance configuration without stenosis or dissection. AORTIC ARCH: Classic three-vessel arch. The origins of the great vessels are unremarkable without significant stenosis. NONVASCULAR STRUCTURES: There are no masses or enlarged lymph nodes in the neck. The submandibular, parotid, and thyroid glands are unremarkable. Multilevel degenerative changes without high-grade spinal canal stenosis or neural foraminal narrowing. No aggressive osseous lesions. Severe emphysema with partially visualized airspace opacity in the anterior left upper lobe, which warrants further evaluation with dedicated thoracic CT. CEREBRAL CT PERFUSION: There is a small volume of perfusion  abnormalities seen surrounding the lesions, which is likely related to the vasogenic edema. No vascular perfusion abnormality is identified. RAPID ANALYSIS: CBF<30% volume: 6 mL Tmax>6sec: 3 mL Mismatch volume: -3 mL Mismatch ratio: 0.5     IMPRESSION: HEAD CT: 1. Development of intracranial lesions with a rim of hyperdensity involving the left parietal lobe and right frontal lobe with surrounding edematous change suspicious for intracranial metastases. These could be better evaluated with a dedicated brain MRI  with/without contrast. HEAD CTA: 1. Patent intracranial vasculature. 2. 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 NECK CTA: 1. Unremarkable neck vasculature. 2. Severe emphysema with partially visualized airspace opacity in the anterior left upper lobe, which warrants further evaluation with dedicated thoracic CT. CEREBRAL CT PERFUSION: 1. Small volume of perfusion abnormality surrounding the presumed intracranial metastasis likely related to the edematous change. - - - - - - - - - - - - - - - - - - - - - - - - - - - - - - - - - - - - - - - - - - - - - - - - - - - - - - - - - - - - - - - - - - - - - - - - - - - - The results above were discussed with Dr. Ellsworth on 6/22/2023 3:54 PM CDT by Dr. Cleveland Topete. - - - - - - - - - - - - - - - - - - - - - - - - - - - - - - - - - - - - - - - - - - - - - - - - - - - - - - - - - - - - - - - - - - - - - - - - - - - -      Pathology  Biopsy refused.     50 minutes to review chart, personally review imaging, discussion with providers, discussion with patient and family       Signed by: Erica Powers MD

## 2023-06-23 NOTE — MEDICATION SCRIBE - ADMISSION MEDICATION HISTORY
Medication Scribe Admission Medication History    Admission medication history is complete. The information provided in this note is only as accurate as the sources available at the time of the update.    Medication reconciliation/reorder completed by provider prior to medication history? No    Information Source(s): Family member, daughter, Katlyn, via in-person    Pertinent Information:     Changes made to PTA medication list:    Added: Estrace    Deleted: None    Changed: None    Medication Affordability:  Not including over the counter (OTC) medications, was there a time in the past 3 months when you did not take your medications as prescribed because of cost?: No    Allergies reviewed with patient and updates made in EHR: yes    Medication History Completed By: Enrique Godinez 6/22/2023 7:00 PM    Prior to Admission medications    Medication Sig Last Dose Taking? Auth Provider Long Term End Date   docusate sodium (COLACE) 100 MG capsule Take 2 capsules (200 mg) by mouth 2 times daily as needed for constipation Past Week Yes Estee Quiles APRN CNP     enalapril-hydrochlorothiazide (VASERETIC) 10-25 MG tablet TAKE ONE TABLET BY MOUTH DAILY 6/21/2023 Yes Erica Phillips MD Yes    estradiol (ESTRACE) 1 MG tablet Take 1 mg by mouth daily 6/21/2023 Yes Reported, Patient No    simvastatin (ZOCOR) 20 MG tablet Take 1 tablet (20 mg) by mouth daily 6/21/2023 Yes Erica Phillips MD Yes

## 2023-06-23 NOTE — PLAN OF CARE
"Goal Outcome Evaluation:       Pt arrived on the floor around 2 am. She is alert with intermittent confusion. Incontinent of bladder. Pt is currently on 1:1. Assist of 1 with cares. On keppra and dexamethazone and tolerating it well.    Problem: Plan of Care - These are the overarching goals to be used throughout the patient stay.    Goal: Patient-Specific Goal (Individualized)  Description: You can add care plan individualizations to a care plan. Examples of Individualization might be:  \"Parent requests to be called daily at 9am for status\", \"I have a hard time hearing out of my right ear\", or \"Do not touch me to wake me up as it startles me\".  Outcome: Progressing     Problem: Plan of Care - These are the overarching goals to be used throughout the patient stay.    Goal: Optimal Comfort and Wellbeing  Outcome: Progressing     Problem: Plan of Care - These are the overarching goals to be used throughout the patient stay.    Goal: Readiness for Transition of Care  Outcome: Progressing  Intervention: Mutually Develop Transition Plan  Recent Flowsheet Documentation  Taken 6/23/2023 0403 by Jose Pisano, RN  Equipment Currently Used at Home: none  Taken 6/23/2023 0200 by Jose Pisano, RN  Equipment Currently Used at Home: none                    "

## 2023-06-23 NOTE — PROGRESS NOTES
Care Management Discharge Note    Discharge Date: 06/23/2023       Discharge Disposition: Home, Hospice    Discharge Services: None    Discharge DME: None    Discharge Transportation: family or friend will provide    Education Provided on the Discharge Plan: Yes    Persons Notified of Discharge Plans: patient, daughter Katlyn    Patient/Family in Agreement with the Plan: yes    Additional Information:  CM met with pt and daughter Katlyn.    [facility] - Referral sent per Katlyn's request.  Katlyn states she has already been in contact with [facility].  They are agreeable to discharge and follow up with [facility] after discharge without a formal acceptance from [facility].    [facility] - Called to [facility] liaison.  [facility] is aware of this referral and they are already in the process of reviewing this patient.  [facility] will follow up with this patient after she discharges to home.  They do not need a formal referral to hospice on our discharge orders.    Pt discharging to [facility] home.  Katlyn to transport.  [facility] to follow in the community.    No further CM needs identified.    Anil Wagner RN

## 2023-06-23 NOTE — PLAN OF CARE
Goal Outcome Evaluation:       As per marta messaging with dr. Vazquez at 2115, pt doesn't need to be on continuous cardiac monitoring.

## 2023-06-23 NOTE — CONSULTS
NEUROSURGERY CONSULTATION NOTE    Jordyn Miller   [id number] [facility] [address]  82 year old female  Admission Date/Time: 6/22/2023  3:05 PM  Primary Care Provider: Erica Phillips [facility] Attending Physician: Arnold Byrd DO    Neurosurgery was asked to see this patient by Dr Leach for new brain lesions.     PROBLEM LIST:  Principal Problem:    Homonymous hemianopia, right  Active Problems:    Intracranial mass    Altered mental status, unspecified altered mental status type       Neurosurgery Attending: The patient's clinical examination, laboratory data, and plan was discussed with Dr Larkin.     CONSULTATION ASSESSMENT AND PLAN:  Brief visit with Jordyn and her daughter for new diagnosis of probable lung cancer as primary with mets to brain (two lesions), possible adrenal and liver. On arrival to the room, daughter verbalizes she was told we would not be part of the treatment process. Discussed that surgery would not be the first line of treatment recommended. Spent time answering questions to the best of my ability. Jordyn is certain she would like to focus on quality of life and decide what if any treatments fit into that goal. Ideally oncology and rad/onc will come discuss with patient and family today. Jordyn states she feels like herself today unlike yesterday. Her daughter states she is improved but not quite her usual. She seems impulsive. Daughter states she is a busy bee. Would consider decreasing dose of steroids if patient seems to be having side effects. Defer to oncology, rad/onc.     HPI: Jordyn Miller is an 82 year old that presented to [facility] ED with Evens word finding difficulty, right homonymous hemianopia. Was normal when she went to bed the night prior. Woke up at 0600 6/22/2023 with symptoms. CT, CTA available so far and show intracranial lesions with a rim of hyperdensity in the left parietal lobe measuring 1.8 x 1.7 cm and right frontal lobe  "measuring 1.7 x 1.3 cm with surrounding edematous change suspicious for intracranial metastases. No known cancer history. Keppra and dexamethasone have been ordered by ED provider appropriately. MRI brain completed as well as CT abdomen, chest, pelvis. Lesions in right lower and left upper lobes suspicious for neoplasm. Left adrenal nodule possible. Right hepatic lesion that is indeterminate. On exam today, Jordyn feels like symptoms of yesterday have resolved. She denies headache or vision disturbance. Speech seems hesitant and she offer little conversation. She would like to discharge home. Moves all extremities. She wishes to focus her care on quality. She is open to discussion with the teams about options but is not sure what treatment if any she would like to pursue.      Past Medical History:   Diagnosis Date     Hypertension      Low back pain     Dr Funk in [facility]     Past Surgical History:   Procedure Laterality Date     ENT SURGERY  6-28-12    Left earlobe repair       REVIEW OF SYSTEMS:  Negative with exception of HPI     MEDICATIONS:  No current outpatient medications on file.         ALLERGIES/SENSITIVITIES:     Allergies   Allergen Reactions     Levofloxacin Unknown       PERTINENT SOCIAL HISTORY: personally reviewed   Social History     Socioeconomic History     Marital status:      Spouse name: None     Number of children: None     Years of education: None     Highest education level: None   Tobacco Use     Smoking status: Every Day     Packs/day: 0.50     Types: Cigarettes     Smokeless tobacco: Never     Tobacco comments:     0.5-1.0 pack per day   Substance and Sexual Activity     Alcohol use: Yes     Comment: Occasional     Drug use: No         FAMILY HISTORY:  No family history on file.     PHYSICAL EXAM:   Constitutional: /84 (BP Location: Right arm)   Pulse 90   Temp 97.8  F (36.6  C) (Oral)   Resp 18   Ht 1.626 m (5' 4\")   Wt 54.4 kg (120 lb)   LMP  (LMP " Unknown)   SpO2 92%   BMI 20.60 kg/m       Mental Status: Alert. Appears anxious, impulsive. Affect is appropriate. Speech is fluent with hesitation.      Motor: No pronator drift of upper extremity. Normal bulk and tone all muscle groups of upper and lower extremities.    Strength:   Full strength bilateral lower extremities      Sensory: Sensation intact bilaterally to light touch.    IMAGING:  I personally reviewed all radiographic images     VERO Alexandre CNP      Cc:   No referring provider defined for this encounter.    Erica Phillips  [unfilled]

## 2023-06-23 NOTE — PROGRESS NOTES
[facility]    Medicine Progress Note - Hospitalist Service    Date of Admission:  6/22/2023    Assessment & Plan   82-year-old female with history of hypertension presents 6/22/2023 with confusion and slurred speech.  Work-up reveals likely primary lung cancer with brain mets.      Altered mental status: Improved after starting steroids  Brain MRI shows 2 enhancing intra-axial masses 2.2 cm right frontal lobe and and 2.9 cm left parietal lobe.  There is a an additional 3 mm area of enhancement right frontal temporal junction  -- Continue Keppra and dexamethasone  -- Radiation oncology consulted        Likely new diagnosis of metastatic lung cancer.  CT on admission shows spiculated pulmonary nodules, left adrenal nodule and right hepatic lesion.  Brain imaging discussed above  -- General oncology consulted since patient is open to hearing treatment options and prognosis before deciding further on goals of care  -- Appreciate palliative care also following  -- Discussed tissue biopsy with IR.  They noted that patient does have emphysema and is at an increased risk for pneumothorax so lung biopsy is not without risk.  This information was shared with the patient.         History of hypertension: Continue home enalapril and hydrochlorothiazide        Hyperlipidemia: Continue home statin        Hypomagnesemia: Resolved with replacement         Diet: Combination Diet Regular Diet Adult    DVT Prophylaxis: Hold lovenox pending decision on biopsy. Place on SCD's  Alcantar Catheter: Not present  Lines: None     Cardiac Monitoring: None  Code Status: No CPR- Do NOT Intubate      Clinically Significant Risk Factors Present on Admission           # Hypercalcemia: Highest Ca = 10.4 mg/dL in last 2 days, will monitor as appropriate  # Hypomagnesemia: Lowest Mg = 1.3 mg/dL in last 2 days, will replace as needed       # Hypertension: Noted on problem list               Disposition Plan      Expected  Discharge Date: 06/24/2023      Destination: home with family;home with help/services            Arnold Byrd DO  Hospitalist Service  [facility]  Securely message with [employer] (more info)  Text page via [employer] Paging/Directory   ______________________________________________________________________    Interval History   NAD. Denies any complaints. Feels confusion is improved. Asking to go home ASAP    Physical Exam   Vital Signs: Temp: 97.8  F (36.6  C) Temp src: Oral BP: 134/84 Pulse: 90   Resp: 18 SpO2: 92 % O2 Device: None (Room air)    Weight: 120 lbs 0 oz  General: NAD  RESPIRATORY: Breathing nonlabored  CARDIOVASCULAR: No le edema bilat.   NEUROLOGIC: Motor and sensory intact, speech clear         Medical Decision Making       >55 MINUTES SPENT BY ME on the date of service doing chart review, history, exam, documentation, family conference & further activities per the note.      Data

## 2023-06-23 NOTE — CONSULTS
SPIRITUAL HEALTH SERVICES NOTE  [facility]; P1    SPIRITUAL ASSESSMENT    N/A  - patient declines visit at this time    FULL SPIRITUAL CARE NOTE:   Saw Jordyn due to Spiritual Care Consult. She was accompanied by her daughter. Jordyn shares that she is being discharged this afternoon and that she prefers not to have a conversation at this time.     Time Spent with Patient/Family: 5 minutes    PLAN OF CARE: No plans for follow-up at this time due to patient's anticipated discharge today.  available by patient or staff request.     Niecy Miller M.Div.      Office: [phone] (for non-urgent requests)  Please Vocera or page through [facility] for time-sensitive requests

## 2023-06-23 NOTE — CONSULTS
NEUROLOGY CONSULTATION NOTE     Jordyn Miller,  1940, MRN [id number] Date: 2023     [facility]   Code status:  No CPR- Do NOT Intubate   PCP: Erica Phillips, [phone]      ASSESSMENT & PLAN   Diagnosis code: Brain mass    Brain mass  Right homonymous hemianopia  Lung cancer      CT head/MRI showed multiple intra-axial masses history of metastatic disease    CT chest abdomen pelvis to show nodules suggestive of primary lung neoplasm    CTA head and neck negative for large vessel occlusion/stenosis    Neurosurgery involved    Decadron    Keppra 500 mg twice a day    Seizure precautions    Check Keppra level    Oncology consultation/palliative care consultation    PT/OT as needed    Can follow-up with the eye doctor as an outpatient.       Chief Complaint   Patient presents with     Altered Mental Status     Stroke Symptoms        HISTORY OF PRESENT ILLNESS     We have been requested by Dr. Byrd to evaluate Jordyn Miller who is a 82 year old  female for evaluation of vision problems.  Is a 82-year-old female with history of hypertension who presented with some confusion and slurred speech.  Patient was unable to provide any history.  She woke up in the morning and had ongoing symptoms.  She was brought to the emergency room.  Stroke code was called.  She was not thought to be a candidate for thrombolytics.  Head CT showed brain mass.  A scan of her chest abdomen pelvis was done which showed lung nodules/primary lung neoplasm.  Patient was started on Decadron and Keppra.  Neurology was involved to help with seizure management.  She continues to have vision problems though feels she is less confused this morning.  Palliative care is involved.  Neurosurgery is also involved.     PAST MEDICAL & SURGICAL HISTORY     Medical History  Past Medical History:   Diagnosis Date     Hypertension      Low back pain     Dr Funk in [facility]     Surgical History  Past Surgical  History:   Procedure Laterality Date     ENT SURGERY  6-28-12    Left earlobe repair        SOCIAL HISTORY     Social History     Tobacco Use     Smoking status: Every Day     Packs/day: 0.50     Types: Cigarettes     Smokeless tobacco: Never     Tobacco comments:     0.5-1.0 pack per day   Substance Use Topics     Alcohol use: Yes     Comment: Occasional     Drug use: No        FAMILY HISTORY     Reviewed, and noncontributory.     ALLERGIES     Allergies   Allergen Reactions     Levofloxacin Unknown        REVIEW OF SYSTEMS     Complete review of systems done and pertinent positives noted in the HPI.     HOME & HOSPITAL MEDICATIONS     Prior to Admission Medications  Medications Prior to Admission   Medication Sig Dispense Refill Last Dose     docusate sodium (COLACE) 100 MG capsule Take 2 capsules (200 mg) by mouth 2 times daily as needed for constipation 100 capsule 0 Past Week     enalapril-hydrochlorothiazide (VASERETIC) 10-25 MG tablet TAKE ONE TABLET BY MOUTH DAILY 90 tablet 3 6/21/2023     estradiol (ESTRACE) 1 MG tablet Take 1 mg by mouth daily   6/21/2023     simvastatin (ZOCOR) 20 MG tablet Take 1 tablet (20 mg) by mouth daily 90 tablet 3 6/21/2023       Hospital Medications    dexamethasone  4 mg Oral Q6H     enalapril  10 mg Oral Daily    And     hydrochlorothiazide  25 mg Oral Daily     [Held by provider] enoxaparin ANTICOAGULANT  40 mg Subcutaneous Q24H     levETIRAcetam  500 mg Intravenous Q12H     mirtazapine  15 mg Oral At Bedtime     nicotine  1 patch Transdermal Daily     nicotine   Transdermal Q8H     sennosides  1 tablet Oral At Bedtime     simvastatin  20 mg Oral Daily     sodium chloride (PF)  3 mL Intracatheter Q8H        PHYSICAL EXAM     Vital signs  Temp:  [97.4  F (36.3  C)-98.3  F (36.8  C)] 97.8  F (36.6  C)  Pulse:  [80-95] 90  Resp:  [16-18] 18  BP: (108-175)/(59-89) 134/84  SpO2:  [91 %-98 %] 92 %    PHYSICAL EXAMINATION  VITALS: /84 (BP Location: Right arm)   Pulse 90    "Temp 97.8  F (36.6  C) (Oral)   Resp 18   Ht 1.626 m (5' 4\")   Wt 54.4 kg (120 lb)   LMP  (LMP Unknown)   SpO2 92%   BMI 20.60 kg/m    GENERAL -Health appearing, No apparent distress  EYES- No scleral icterus, no eyelid droop, Pupils - see Neuro section  HEENT - Normocephalic, atraumatic, Hearing grossly intact; Oral mucosa moist and pink in color. External Ears and nose intact.   Neck - supple   PULM - Good spontaneous respiratory effort; Normal palpation of chest.   CV-extremities warm and well-perfused.  MSK- Gait - see Neuro section; Strength and tone- see Neuro section; Range of motion grossly intact.  PSYCH -cooperative    Neurological  Mental status - Patient is awake and not oriented to self, place and time. Attention span is normal. Language is fluent and follows commands appropriately.   Cranial nerves - CN II-XII intact. Pupils are reactive and symmetric; EOMI, NLF symmetric.  Right-sided field cut.  Motor -strength in all 4 extremities tested grossly.  Tone - Tone is symmetric bilaterally in upper and lower extremities.  Reflexes -toes mute.  Sensation - Sensory exam is grossly intact to light touch, pain.  Coordination - Finger to nose is without dysmetria  Gait and station --unable to safely ambulate.  Formal gait testing cannot be done due to safety concerns from ongoing medical issues.       DIAGNOSTIC STUDIES     Pertinent Radiology-images reviewed the family    MRI  IMPRESSION:  1.  2 enhancing intra-axial masses as noted on the head CT measuring up to 2.2 cm in the right frontal lobe and 2.9 cm the left parietal lobe. Presumably these represent metastatic disease.  2.  3 mm cortical focus of enhancement at the right frontoparietal junction probably represents an additional metastasis. A tiny subacute infarct could have a similar appearance.  3.  Localized mass effect without herniation or midline shift. No hydrocephalus.  4.  No hemorrhage or acute infarct.  5.  Note that several sequences " are degraded by motion artifact and additional small metastases could be obscured.    CT Abd/pelvis/chest  IMPRESSION:  1.  Spiculated pulmonary nodules in the right lower and left upper lobes, suspicious for primary lung neoplasm.  2.  Possible left adrenal nodule, metastasis is possible.  3.  Indeterminate 1.2 cm right hepatic lesion, could consider attention on follow-up imaging versus PET.    HEAD CT:  1. Development of intracranial lesions with a rim of hyperdensity involving the left parietal lobe and right frontal lobe with surrounding edematous change suspicious for intracranial metastases. These could be better evaluated with a dedicated brain MRI   with/without contrast.     HEAD CTA:  1. Patent intracranial vasculature.     2. 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      NECK CTA:  1. Unremarkable neck vasculature.      2. Severe emphysema with partially visualized airspace opacity in the anterior left upper lobe, which warrants further evaluation with dedicated thoracic CT.     CEREBRAL CT PERFUSION:   1. Small volume of perfusion abnormality surrounding the presumed intracranial metastasis likely related to the edematous change.    Recent Results (from the past 24 hour(s))   Extra Blue Top Tube    Collection Time: 06/22/23  3:09 PM   Result Value Ref Range    Hold Specimen JIC    Extra Red Top Tube    Collection Time: 06/22/23  3:09 PM   Result Value Ref Range    Hold Specimen JIC    Extra Green Top (Lithium Heparin) Tube    Collection Time: 06/22/23  3:09 PM   Result Value Ref Range    Hold Specimen JIC    Extra Purple Top Tube    Collection Time: 06/22/23  3:09 PM   Result Value Ref Range    Hold Specimen JIC    Glucose by meter    Collection Time: 06/22/23  3:11 PM   Result Value Ref Range    GLUCOSE BY METER POCT 94 70 - 99 mg/dL   Basic metabolic panel    Collection Time: 06/22/23  3:14 PM   Result Value Ref Range    Sodium 143 136 - 145 mmol/L    Potassium 4.1 3.4 - 5.3  mmol/L    Chloride 104 98 - 107 mmol/L    Carbon Dioxide (CO2) 27 22 - 29 mmol/L    Anion Gap 12 7 - 15 mmol/L    Urea Nitrogen 22.2 8.0 - 23.0 mg/dL    Creatinine 0.99 (H) 0.51 - 0.95 mg/dL    Calcium 10.4 (H) 8.8 - 10.2 mg/dL    Glucose 99 70 - 99 mg/dL    GFR Estimate 57 (L) >60 mL/min/1.73m2   INR    Collection Time: 06/22/23  3:14 PM   Result Value Ref Range    INR 0.96 0.85 - 1.15   Partial thromboplastin time    Collection Time: 06/22/23  3:14 PM   Result Value Ref Range    aPTT 41 (H) 22 - 38 Seconds   Troponin T, High Sensitivity    Collection Time: 06/22/23  3:14 PM   Result Value Ref Range    Troponin T, High Sensitivity 16 (H) <=14 ng/L   CBC with platelets and differential    Collection Time: 06/22/23  3:14 PM   Result Value Ref Range    WBC Count 7.7 4.0 - 11.0 10e3/uL    RBC Count 4.19 3.80 - 5.20 10e6/uL    Hemoglobin 13.4 11.7 - 15.7 g/dL    Hematocrit 38.7 35.0 - 47.0 %    MCV 92 78 - 100 fL    MCH 32.0 26.5 - 33.0 pg    MCHC 34.6 31.5 - 36.5 g/dL    RDW 14.3 10.0 - 15.0 %    Platelet Count 214 150 - 450 10e3/uL    % Neutrophils 73 %    % Lymphocytes 16 %    % Monocytes 9 %    % Eosinophils 1 %    % Basophils 1 %    % Immature Granulocytes 0 %    NRBCs per 100 WBC 0 <1 /100    Absolute Neutrophils 5.6 1.6 - 8.3 10e3/uL    Absolute Lymphocytes 1.3 0.8 - 5.3 10e3/uL    Absolute Monocytes 0.7 0.0 - 1.3 10e3/uL    Absolute Eosinophils 0.0 0.0 - 0.7 10e3/uL    Absolute Basophils 0.1 0.0 - 0.2 10e3/uL    Absolute Immature Granulocytes 0.0 <=0.4 10e3/uL    Absolute NRBCs 0.0 10e3/uL   Magnesium    Collection Time: 06/22/23  3:14 PM   Result Value Ref Range    Magnesium 1.3 (L) 1.7 - 2.3 mg/dL   ECG 12-LEAD WITH MUSE (E)    Collection Time: 06/22/23  4:09 PM   Result Value Ref Range    Systolic Blood Pressure  mmHg    Diastolic Blood Pressure  mmHg    Ventricular Rate 83 BPM    Atrial Rate 83 BPM    MT Interval 146 ms    QRS Duration 78 ms     ms    QTc 413 ms    P Axis 75 degrees    R AXIS 53  degrees    T Axis 59 degrees    Interpretation ECG       Sinus rhythm  Normal ECG  No previous ECGs available  Confirmed by SEE ED PROVIDER NOTE FOR, ECG INTERPRETATION ([id number]),  LESLEY MCKENZIE ([id number]) on 6/22/2023 11:46:06 PM     Magnesium    Collection Time: 06/23/23  4:36 AM   Result Value Ref Range    Magnesium 3.2 (H) 1.7 - 2.3 mg/dL   CBC with platelets    Collection Time: 06/23/23  4:36 AM   Result Value Ref Range    WBC Count 6.2 4.0 - 11.0 10e3/uL    RBC Count 4.43 3.80 - 5.20 10e6/uL    Hemoglobin 14.2 11.7 - 15.7 g/dL    Hematocrit 40.5 35.0 - 47.0 %    MCV 91 78 - 100 fL    MCH 32.1 26.5 - 33.0 pg    MCHC 35.1 31.5 - 36.5 g/dL    RDW 14.1 10.0 - 15.0 %    Platelet Count 206 150 - 450 10e3/uL   Potassium    Collection Time: 06/23/23  4:36 AM   Result Value Ref Range    Potassium 4.5 3.4 - 5.3 mmol/L   Glucose by meter    Collection Time: 06/23/23  8:39 AM   Result Value Ref Range    GLUCOSE BY METER POCT 161 (H) 70 - 99 mg/dL       Total time spent for face to face visit, reviewing labs/imaging studies, counseling and coordination of care was: Over 80 min More than 50% of this time was spent on counseling and coordination of care.    Counseling patient family.  Coordination of care with the primary team.  Discharge planning.  Reviewing imaging studies with the family.    Jacob Clancy MD  Neurologist  [facility]  Tel:- [phone]

## 2023-06-23 NOTE — CONSULTS
"Care Management Initial Consult    General Information  Assessment completed with: Víctor, Katlyn daughter. Patient was at imaging.  Type of CM/SW Visit: Initial Assessment    Primary Care Provider verified and updated as needed: Yes   Readmission within the last 30 days: no previous admission in last 30 days      Reason for Consult: discharge planning, community resources, facility placement  Advance Care Planning: Advance Care Planning Reviewed: no concerns identified          Communication Assessment  Patient's communication style: spoken language (English or Bilingual)          Living Environment:   People in home: significant other  JordynCarolyn significant other  Current living Arrangements: house      Able to return to prior arrangements: no (Likely will be unable to return home. Daughter states, \"she will come home with me to my house\".)       Family/Social Support:  Care provided by: self  Provides care for: no one  Marital Status: Lives with Significant Other  Significant Other, Víctor Obrien  Description of Support System: Supportive, Involved    Support Assessment: Adequate social supports, Adequate family and caregiver support    Current Resources:   Patient receiving home care services: No     Community Resources: None  Equipment currently used at home: none  Supplies currently used at home: Other (\"glasses\")    Employment/Financial:  Employment Status: retired     Employment/ Comments: \"no  benefits\"  Financial Concerns:     Referral to Financial Worker: No       Does the patient's insurance plan have a 3 day qualifying hospital stay waiver?  Yes   Will the waiver be used for post-acute placement? Unknown at this time if TCU placement would be needed. Daughter Katlyn states, \"I don't want her to go to any TCU. I would only want her to come home to my house and I will care for her and get private pay help if needed.\"    Lifestyle & Psychosocial Needs:  Social Determinants of " "Health     Tobacco Use: High Risk (6/22/2023)    Patient History      Smoking Tobacco Use: Every Day      Smokeless Tobacco Use: Never      Passive Exposure: Not on file   Alcohol Use: Not on file   Financial Resource Strain: Not on file   Food Insecurity: Not on file   Transportation Needs: Not on file   Physical Activity: Not on file   Stress: Not on file   Social Connections: Not on file   Intimate Partner Violence: Not on file   Depression: Not at risk (1/30/2023)    PHQ-2      PHQ-2 Score: 0   Housing Stability: Not on file       Functional Status:  Prior to admission patient needed assistance:   Dependent ADLs:: Independent, Ambulation-no assistive device  Dependent IADLs:: Independent  Assesssment of Functional Status: Not at baseline with ADL Functioning, Not at baseline with mobility, Not at  functional baseline    Mental Health Status:          Chemical Dependency Status:                Values/Beliefs:  Spiritual, Cultural Beliefs, Advent Practices, Values that affect care:                 Additional Information:  Jordyn lives in a house with her significant other Micah. She was independent with ADLs and IADLs at baseline before today. She was \"driving and very independent. She doesn't use a cane or walker\". Confusion is new and now a new diagnosis of brain mets with likely primary lung cancer.    Likely will be unable to return home with significant other. Daughter states, \"she will come home with me to my house in [place]. She will be able to live on the main level if that is needed. My  and I will move ourselves downstairs if she needs the main level. I would get a private pay nurse and home care services if needed and I will also care for her. I don't want her to go to any TCU or nursing home. I would never do that to her.\"    Katlyn daughter was \"under the impression that a lot of home care services would be covered under insurance.\" We discussed skilled home care vs private pay home care. " Also I did a brief introduction of Palliative Care services. Also talked a little about TCUs. I gave her information on [facility], [facility], and [facility] [place] since she wanted to look into private pay services that would be available. Palliative Care consult still pending and may also need Hospice involvement depending on hospital course. A lot more planning may be needed after consult with Palliative Care about what patient and family want plans to be going forward since this is a new diagnosis for her.    Katlyn daughter [phone].    Daughter to transport at discharge.    CM to follow for medical progression of care, discharge recommendations, and final discharge plan.    Jolanta Petersen RN

## 2023-06-23 NOTE — PLAN OF CARE
Goal Outcome Evaluation:         Pt. And family anxious to leave. They are in the hallway waiting to leave. It was explained to patient and family that the doses of dexamethasone need to be changed before they leave . And the why . Was explained.  Also a radiation schedule was going to be added to the discharge summary. Family said that they will leave without the paperwork, and they will find out from the pharmacy what was ordered. Family left knowing that the discharge summery was not accurate.

## 2023-06-23 NOTE — CONSULTS
Palliative Care Consultation Note  [facility]      Patient: Jordyn Miller  Date of Admission:  6/22/2023    Requesting Clinician / Team: Dr. Megan Vazquez / [facility] [place] [facility]  Reason for consult: Goals of care     Recommendations & Counseling     GOALS OF CARE:     Comfort focused .  Not on comfort care as life expectancy to this writer appears weeks-months.     Jordyn wishes to maximize time at home/with family, she doesn't want to prolong the dying process when it begins.  Prefers less medically-intense settings.    She and family wish to get information about radiation treatment for brain metastases; we discussed that palliative radiation may provide some benefit, and they have questions for Rad Onc.    She and family wish to speak with [facility] about prognosis; we discussed that without a tissue diagnosis it may be difficult to provide full array of treatment options; patient affirms she isn't inclined to have chemo or immunotherapy even if it were offered and daughters support her.    Would not want escalation of care to ICU if clinical decline occurs in hospital.     ADVANCE CARE PLANNING:    has health care directive, not on file.  Daughter Katlyn will bring to hospital.    New POLST form completed today.   Jordyn has capacity and completed this with daughters Katlyn and Jessica present.  DNR/comfort focused POLST, no artificial nutrition desired.    Code status: No CPR- Do NOT Intubate     Advance Care Planning Discussion 6/23/2023. IDoris MD met with the Patient and their family today at the hospital to discuss Advance Care Planning .  Patient does have decisional capacity and was present for this discussion.  Those present were informed of the voluntary nature of this discussion and wished to proceed.  The discussion included: clarifying goals of care (comfort), completing POLST (DNR/comfort, no artificial nutrition).. This discussion began at 1020 and ended at 1045  for a total of 25 minutes. Ordering  providers have been notified of the discussion results.     MEDICAL MANAGEMENT:     #Delirium/encephalopathy due to brain metastases  #Agitation (improved this morning)    Avoid benzodiazepines, antihistamines, anticholinergics if able.    Lights on and blinds open during the day.  Reorient frequently.    Lights & TV off during the night.  Promote normal circadian rhythm.    Limit sensory deprivation - utilize hearing aids, glasses, etc.    Frequently assess basic needs such as temperature, elimination, thirst/hunger, pain    Consider bedside attendant (if able) for additional safety    Discontinue telemetry (had leads on during my visit) and voiding trial to attempt to remove bauer catheter.    Olanzapine (Zyprexa) ODT 5mg q6H prn agitation.    Agree w keppra as you are (IV currently)    Agree w steroids     Monitor bowel regimen, schedule senna at hs (once daily).    #Anxiety, Acute Anxiety  # Nicotine withdrawal    Identify triggers for anxiety/panic attacks and Identify early signs/symptoms of anxiety    Not on selective serotonin reuptake inhibitor at baseline, time to benefit 4 weeks; consider starting but refrain for now.    For now, refrain from benzodiazepine.    Could consider mirtazapine at hs if needed for insomnia.    Use nicotine lozenges prn for craving and nicotine patch also (done w MRI)     #Dyspnea,Emphysema: Minimal.    Oxygen    Repositioning    Fan at bedside if worsening dyspnea.    # polypharmacy  - could consider deprescribing statin.  - monitor blood pressure, defer to HMS on potentially deprescribing antihypertensive medications/liberalizing goal blood pressures.    PSYCHOSOCIAL/SPIRITUAL SUPPORT:    Family     Friends  [place] community: [employer].  Welcomes spiritual care (ordered for you).  Will request palliative SW support for emotional support.    Palliative Care will continue to follow. Thank you for the consult and allowing us to aid in  "the care of Jordyn Miller.    These recommendations have been discussed with Dr Byrd.    Doris Murry MD  Securely message with NextPage (more info)  Text page via [facility] Paging/Directory       Palliative Summary/HPI     Jordyn Miller is a 82 year old female with a past medical history of  Hypertension, hyperlipidemia, ongoing tobacco abuse, osteopenia who presented on 6/22/23 with altered mental status, speech difficulty and visual field cut and was found to have imaging suggestive of metastatic brain lesions and a Left Lower Lobe spiculated nodule on CTA chest suggestive of a primary lung malignancy.      She was initially evaluated by stroke neurologist, MRI shows two and possibly a third metastatic lesion, and started on keppra and dexamethasone per neurosurgery.  IR has been consulted for tissue diagnosis to clarify treatment options.  [facility] also consulted and radiation oncology.    Today, the patient was seen for:  Introduction to palliative care, establish rapport, early discussion of goals of care.    Palliative Care Summary:   Met with Jordyn, jamila Potts and Jessica, in room 111.     I introduced our role as an extra layer of support and how we help patients and families dealing with serious, potentially life-limiting illnesses. I explained the composition of the palliative care team.  Palliative care helps patients and families navigate their care while focusing on the whole person; providing emotional, social and spiritual support  Palliative care often assists with symptom management, information sharing about what to expect from the illness, available treatment options and what effect those options may have on the disease course, and provide effective communication and caring support.    Jordyn and daughters are aware she has what appear to be metastatic lesions in her brain, and lesion(s) in left lower lobe of lung that appear suspicious for malignancy.  Jordyn tells me \"I don't " "want to draw this out\" [meaning end of life] and that \"I want the path of least resistance.\"      Prior to admission, Jordyn had unintended 25 lb weight loss in past year but also had teeth pulled and dentures placed; she had been a primary caregiver for her significant other Micah who has multiple serious medical issues.  She was still driving, cooking, cleaning and managing her home she shares with Micah, putting the home on the market with plan to build a suite attached to daughter Katlyn's home in [place] and move in there once the house is sold.  She was feeling like caregiving for Micah was becoming more challenging.    What's most important: time with my family.    What brings eulalia: helping others, independence.     Prognosis, Goals, & Planning:      Functional Status just prior to this current hospitalization:    has been intermittently confused in ED/hospital.     ECOG2 (Ambulatory and capable of all selfcare but unable to carry out any work activities; may need help with IADLs up and about > 50% of waking hours)      Prognosis, Goals, and/or Advance Care Planning:    We discussed general treatment options (full/restorative, selective/conservatives, and comfort only/hospice). We then discussed how these specifically apply to what appears to be advanced cancer.  I reviewed that should Jordyn wish to pursue palliative intent cancer treatments (unclear to the writer curative intent is possible) a tissue biopsy would be recommended, and that with her lung disease she could have risk of pneumothorax or other complications from a mediastinal biopsy.  . Based on this discussion, Jordyn clearly and repeatedly voiced a desire to forego biopsy because she is not inclined to accept cancer treatments (we discussed potential for chemo and immunotherapy depending on tissue diagnosis if she had one).      Code Status was addressed today:     Yes, We discussed potential risks and rationale of attempting cardiac " resuscitation, intubation, and mechanical ventilation.  We also discussed probability of survival as well as quality of life implications.  Based on this discussion, patient or surrogate response/decision: continue DNR/DNI.        Patient's decision making preferences: shared with support from loved ones          Patient has decision-making capacity today for complex decisions:Intact, Jordyn is able to voice understanding of cancer diagnosis, and consistently voiced desire to forego biopsy.              Coping, Meaning, & Spirituality:     Mood, coping, and/or meaning in the context of serious illness were addressed today: Yes    Social:   Living situation:lives in private home with significant other, Micah.  Working on selling the home.  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  Areas of fulfillment/eulalia: family, two daughters, two grandsons.  Stressors : caregiver role for Micah becoming a challenge, and he will need increasing support that Jordyn is unable to provide.  Substance use: ongoing smoker (approx 1ppd per daughters)    Medications:  I have reviewed this patient's medication profile and medications from this hospitalization. Notable medications:  - dexamethasone  Enalapril and hydrochlorothiazide  Iv keppra  Nicotine patch (started for you)  Simvastatin  Prn apap  Prn hydralazine  Prn melatonin    Not currently needing opioid therapy.    ROS:  Comprehensive ROS is reviewed and is negative except as here & per HPI:   Denies pain.  Denies dyspnea  Endorses cough  Endorses nicotine craving  Endorses feeling anxious, wanting to get outside to smoke.  Denies nausea  Denies headache.  Appetite fair.    Physical Exam   Vital Signs with Ranges  Temp:  [97.4  F (36.3  C)-98.3  F (36.8  C)] 97.8  F (36.6  C)  Pulse:  [80-95] 90  Resp:  [16-18]  18  BP: (108-175)/(59-89) 134/84  SpO2:  [91 %-98 %] 92 %  120 lbs 0 oz    Body mass index is 20.6 kg/m .    Intake/Output Summary (Last 24 hours) at 6/23/2023 1120  Last data filed at 6/23/2023 1000  Gross per 24 hour   Intake 340 ml   Output --   Net 340 ml       PHYSICAL EXAM:   Alert 81 yo woman, oriented to self, place, situation.  Repeatedly requests to go outside to smoke.  Neck supple, no LAD.    Lungs CTA without wheezes or rales.  Breathing nonlabored, occasional coughing.  Heart S1S2 no m/g/r  abd soft, nontender, no HSM.  Speech at times halting, some difficulty with getting words out.  Able to affirm preferences when the examiner states them in the positive and negative.    No tremor.  Affect full.    Data reviewed:     Last Comprehensive Metabolic Panel:  Lab Results   Component Value Date     06/22/2023    POTASSIUM 4.5 06/23/2023    CHLORIDE 104 06/22/2023    CO2 27 06/22/2023    ANIONGAP 12 06/22/2023     (H) 06/23/2023    BUN 22.2 06/22/2023    CR 0.99 (H) 06/22/2023    GFRESTIMATED 57 (L) 06/22/2023    PAVITHRA 10.4 (H) 06/22/2023       CBC RESULTS: Recent Labs   Lab Test 06/23/23  0436   WBC 6.2   RBC 4.43   HGB 14.2   HCT 40.5   MCV 91   MCH 32.1   MCHC 35.1   RDW 14.1        Lab Results   Component Value Date    AST 30 11/19/2021     Lab Results   Component Value Date    ALT 30 11/19/2021     No results found for: BILICONJ   Lab Results   Component Value Date    BILITOTAL 0.9 11/19/2021     Lab Results   Component Value Date    ALBUMIN 3.9 11/19/2021     Lab Results   Component Value Date    PROTTOTAL 7.6 11/19/2021      Lab Results   Component Value Date    ALKPHOS 100 11/19/2021     MRI brain 6/22/23:  IMPRESSION:   1.  2 enhancing intra-axial masses as noted on the head CT measuring up to 2.2 cm in the right frontal lobe and 2.9 cm the left parietal lobe. Presumably these represent metastatic disease.   2.  3 mm cortical focus of enhancement at the right frontoparietal  junction probably represents an additional metastasis. A tiny subacute infarct could have a similar appearance.   3.  Localized mass effect without herniation or midline shift. No hydrocephalus.   4.  No hemorrhage or acute infarct.   5.  Note that several sequences are degraded by motion artifact and additional small metastases could be obscured.    CT Chest abdomen pelvis 6/22/23:  IMPRESSION:   1.  Spiculated pulmonary nodules in the right lower and left upper lobes, suspicious for primary lung neoplasm.   2.  Possible left adrenal nodule, metastasis is possible.   3.  Indeterminate 1.2 cm right hepatic lesion, could consider attention on follow-up imaging versus PET.    CT head 6/22/23:  IMPRESSION:     HEAD NECK CT perfusion w contrast:   1. Development of intracranial lesions with a rim of hyperdensity involving the left parietal lobe and right frontal lobe with surrounding edematous change suspicious for intracranial metastases. These could be better evaluated with a dedicated brain MRI    with/without contrast.     HEAD CTA:   1. Patent intracranial vasculature.     2. 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     NECK CTA:   1. Unremarkable neck vasculature.     2. Severe emphysema with partially visualized airspace opacity in the anterior left upper lobe, which warrants further evaluation with dedicated thoracic CT.     CEREBRAL CT PERFUSION:   1. Small volume of perfusion abnormality surrounding the presumed intracranial metastasis likely related to the edematous change.     65 minutes spent on the date of the encounter doing chart review, discussions with hospitalist, performing history and exam, documentation and further activities per the note.  Additional 25 minutes (time documented under advanced care planning) designated on ACP and completion of POLST.  Total of 90 minutes.      Doris Murry MD  [facility], Palliative Care  Securely message with the Vocera Web Console (learn  more here) or  Text page via [facility] Paging/Directory

## 2023-06-23 NOTE — CONSULTS
[facility] Hematology and Oncology Consult Note    Patient: Jordyn Miller  MRN: [id number]  Date of Service: Jun 22, 2023           Reason for consultation      Problem List Items Addressed This Visit        Other    Intracranial mass    * (Principal) Homonymous hemianopia, right    Altered mental status, unspecified altered mental status type         Assessment / number of problems addressed      1.  A very pleasant 82-year-old woman with what looks like metastatic  lung cancer.  2.  Symptomatic brain metastases.  3.  Significant emphysema on CT chest.  4.  Difficult lung biopsy as per interventional radiology.  5.  Some degree of deconditioning.    Plan and medical decision making      Very complicated decision-making process.  Discussed with the patient and the family that the for step is to probably get her a PET scan.  This can be done as an outpatient.  Depending upon what the PET scan lights up we can find the easiest place to biopsy.    As for the brain metastasis concern I did speak with Dr. Erica Powers from radiation oncology.  She will require radiation therapy.  We did talk about neurosurgical options.  Since she appears to be frail and appears to have metastatic disease doing craniotomy probably is not the best thing for patient.  I did  the patient and her 2 daughters at length that radiation therapy is likely to provide best palliation of her symptoms even if they do not want to treat her lung cancer in general.    Discussed with Dr. Byrd as well.    In the meantime she can be started on antiinflammatory agents like dexamethasone and seizure prophylaxis with Keppra.    We will follow-up as an outpatient with a diagnostic work-up after the PET scan is done.  I gave the patient's daughter  clinic phone number.    Medical decision making    Patient presenting with malignant diagnosis which obviously poses threat to life and or bodily function.    The risk of morbidity from  treatment as well as diagnostic procedure is high due to the risk of pneumothorax, bleeding, infection and risk of hospitalization.  This treatment also requires very close monitoring.    Extensive review of clinical data including the notes from other providers, outside sources including in care everywhere and review of each unique test.  Also ordered lab tests for this condition.    Independently reviewed the images of the scans, compared it to the previous scan to arrive at treatment decision.        Clinical/pathological stage      Cancer Staging   No matching staging information was found for the patient.    History of present illness      Ms. Jordyn Miller is a 82 year old woman who has been admitted to the emergency room for evaluation of newly diagnosed confusion and some mental status changes.  The patient was presented after she was found to be confused, dysarthric having some aphasia and vision changes.  In the emergency room she was noted to have right-sided homonymous hemianopsia as well as pupillary irregularity.  Due to the concern for a CVA/TIA an MRI of the brain was done.  The MRI showed that she had 2 enhancing brain lesions.  One in the right frontal lobe and the other one in the left occipital lobe.  There was significant vasogenic edema associate with that.  Then a CT scan of the chest revealed a right middle lobe mass.  There is also some other spiculated lesions.  She has emphysema as well.  There was a lesion noted in the left adrenal which was felt to be somewhat suspicious.  There is also a lesion in the liver which is somewhat cystic but not entirely ruled out for metastatic disease.    The patient still having confusion.    We were asked to give an opinion regarding diagnostic process and work-up.    Interventional radiology who did see the patient opined that they can biopsy the right lung lesion however due to the fact that she has emphysema she might get pneumothorax postbiopsy.   "Not the easiest biopsy in their opinion.    Today the patient feels better.  She is aware of what is going on.  Her 2 daughters were with her at the time of my visit with the patient.  She was able to speak slowly.  Appeared to be coherent.    Detailed review of systems      A 14 point review of systems was obtained.  Positive findings noted in the history.  Rest of the review of system is otherwise negative.      Past medical/surgical/social/family history        Past Medical History:   Diagnosis Date     Hypertension      Low back pain     Dr Funk in [facility] [place]     Past Surgical History:   Procedure Laterality Date     ENT SURGERY  6-28-12    Left earlobe repair     No family history on file.  Social History     Socioeconomic History     Marital status:      Spouse name: None     Number of children: None     Years of education: None     Highest education level: None   Tobacco Use     Smoking status: Every Day     Packs/day: 0.50     Types: Cigarettes     Smokeless tobacco: Never     Tobacco comments:     0.5-1.0 pack per day   Substance and Sexual Activity     Alcohol use: Yes     Comment: Occasional     Drug use: No           Allergies      Allergies   Allergen Reactions     Levofloxacin Unknown         Physical exam        /84 (BP Location: Right arm)   Pulse 90   Temp 97.8  F (36.6  C) (Oral)   Resp 18   Ht 1.626 m (5' 4\")   Wt 54.4 kg (120 lb)   LMP  (LMP Unknown)   SpO2 92%   BMI 20.60 kg/m        GENERAL: Alert and oriented to time place and person. Seated comfortably. In no distress.    HEAD: Atraumatic and normocephalic.    EYES: Pupils are unequal.  Her right pupil is dilated and irregular in shape.  The left pupil is normal, EOMI. No pallor. No icterus.    Oral cavity: no mucosal lesion or tonsillar enlargement.    NECK: supple. JVP normal.No thyroid enlargement.    LYMPH NODES: No palpable, cervical, axillary or inguinal lymphadenopathy.    CHEST: clear to auscultation " bilaterally. Symmetrical breath movements bilaterally.    CVS: S1 and S2 are Regular rate and rhythm. No murmur or gallop or rub heard. No peripheral edema.    ABDOMEN: Soft. Not tender. Not distended. No palpable hepatomegaly or splenomegaly. No other mass palpable. Bowel sounds heard.    EXTREMITIES: Warm.  Minimal arthritic changes.    NEUROLOGICAL: Alert awake oriented.  Otherwise intact.  Cranial nerves appears to be preserved.    SKIN: no rash, or bruising or purpura.      Laboratory data      Recent Results (from the past 168 hour(s))   Extra Blue Top Tube   Result Value Ref Range    Hold Specimen JIC    Extra Red Top Tube   Result Value Ref Range    Hold Specimen JIC    Extra Green Top (Lithium Heparin) Tube   Result Value Ref Range    Hold Specimen JIC    Extra Purple Top Tube   Result Value Ref Range    Hold Specimen JIC    Glucose by meter   Result Value Ref Range    GLUCOSE BY METER POCT 94 70 - 99 mg/dL   Basic metabolic panel   Result Value Ref Range    Sodium 143 136 - 145 mmol/L    Potassium 4.1 3.4 - 5.3 mmol/L    Chloride 104 98 - 107 mmol/L    Carbon Dioxide (CO2) 27 22 - 29 mmol/L    Anion Gap 12 7 - 15 mmol/L    Urea Nitrogen 22.2 8.0 - 23.0 mg/dL    Creatinine 0.99 (H) 0.51 - 0.95 mg/dL    Calcium 10.4 (H) 8.8 - 10.2 mg/dL    Glucose 99 70 - 99 mg/dL    GFR Estimate 57 (L) >60 mL/min/1.73m2   INR   Result Value Ref Range    INR 0.96 0.85 - 1.15   Partial thromboplastin time   Result Value Ref Range    aPTT 41 (H) 22 - 38 Seconds   Troponin T, High Sensitivity   Result Value Ref Range    Troponin T, High Sensitivity 16 (H) <=14 ng/L   CBC with platelets and differential   Result Value Ref Range    WBC Count 7.7 4.0 - 11.0 10e3/uL    RBC Count 4.19 3.80 - 5.20 10e6/uL    Hemoglobin 13.4 11.7 - 15.7 g/dL    Hematocrit 38.7 35.0 - 47.0 %    MCV 92 78 - 100 fL    MCH 32.0 26.5 - 33.0 pg    MCHC 34.6 31.5 - 36.5 g/dL    RDW 14.3 10.0 - 15.0 %    Platelet Count 214 150 - 450 10e3/uL    % Neutrophils  73 %    % Lymphocytes 16 %    % Monocytes 9 %    % Eosinophils 1 %    % Basophils 1 %    % Immature Granulocytes 0 %    NRBCs per 100 WBC 0 <1 /100    Absolute Neutrophils 5.6 1.6 - 8.3 10e3/uL    Absolute Lymphocytes 1.3 0.8 - 5.3 10e3/uL    Absolute Monocytes 0.7 0.0 - 1.3 10e3/uL    Absolute Eosinophils 0.0 0.0 - 0.7 10e3/uL    Absolute Basophils 0.1 0.0 - 0.2 10e3/uL    Absolute Immature Granulocytes 0.0 <=0.4 10e3/uL    Absolute NRBCs 0.0 10e3/uL   Magnesium   Result Value Ref Range    Magnesium 1.3 (L) 1.7 - 2.3 mg/dL   ECG 12-LEAD WITH MUSE (LHE)   Result Value Ref Range    Systolic Blood Pressure  mmHg    Diastolic Blood Pressure  mmHg    Ventricular Rate 83 BPM    Atrial Rate 83 BPM    ID Interval 146 ms    QRS Duration 78 ms     ms    QTc 413 ms    P Axis 75 degrees    R AXIS 53 degrees    T Axis 59 degrees    Interpretation ECG       Sinus rhythm  Normal ECG  No previous ECGs available  Confirmed by SEE ED PROVIDER NOTE FOR, ECG INTERPRETATION ([id number]),  LESLEY MCKENZIE ([id number]) on 6/22/2023 11:46:06 PM     Magnesium   Result Value Ref Range    Magnesium 3.2 (H) 1.7 - 2.3 mg/dL   CBC with platelets   Result Value Ref Range    WBC Count 6.2 4.0 - 11.0 10e3/uL    RBC Count 4.43 3.80 - 5.20 10e6/uL    Hemoglobin 14.2 11.7 - 15.7 g/dL    Hematocrit 40.5 35.0 - 47.0 %    MCV 91 78 - 100 fL    MCH 32.1 26.5 - 33.0 pg    MCHC 35.1 31.5 - 36.5 g/dL    RDW 14.1 10.0 - 15.0 %    Platelet Count 206 150 - 450 10e3/uL   Potassium   Result Value Ref Range    Potassium 4.5 3.4 - 5.3 mmol/L   Glucose by meter   Result Value Ref Range    GLUCOSE BY METER POCT 161 (H) 70 - 99 mg/dL       Imaging results        MR Brain w/o & w Contrast    Result Date: 6/22/2023  EXAM: MR BRAIN W/O and W CONTRAST LOCATION: [facility] DATE: 6/22/2023 INDICATION: New mass MET. COMPARISON: Head CT 6/22/2023 CONTRAST: 5 mL Gadavist TECHNIQUE: Routine multiplanar multisequence head MRI without and with  intravenous contrast. FINDINGS: No restricted diffusion to indicate an acute infarct. A number of sequences are significantly degraded by motion artifact. Peripherally enhancing intra-axial mass in the right frontal lobe measures 1.8 x 2.0 x 2.2 cm. Heterogeneously enhancing lesion in the left parietal lobe measures approximately 2.9 x 2.0 x 2.0 cm. Moderate adjacent vasogenic edema with localized mass effect. No other lesions. 3 mm focus of enhancement within the cortex at the right frontoparietal junction. Paranasal sinuses are free from significant disease. Patchy fluid within the left mastoid air cells. Right appear clear. Intraorbital contents are unremarkable. There is mild to moderate generalized prominence of the sulci and ventricles, consistent with  underlying volume loss. Intracranial flow voids are intact. There are scattered foci of T2/FLAIR hyperintensity within the cerebral white matter and central sarah that are nonspecific but probably reflect the sequela of chronic small vessel ischemic disease. Chronic right temporal parietal infarcts.     IMPRESSION: 1.  2 enhancing intra-axial masses as noted on the head CT measuring up to 2.2 cm in the right frontal lobe and 2.9 cm the left parietal lobe. Presumably these represent metastatic disease. 2.  3 mm cortical focus of enhancement at the right frontoparietal junction probably represents an additional metastasis. A tiny subacute infarct could have a similar appearance. 3.  Localized mass effect without herniation or midline shift. No hydrocephalus. 4.  No hemorrhage or acute infarct. 5.  Note that several sequences are degraded by motion artifact and additional small metastases could be obscured.    CT Chest/Abdomen/Pelvis w Contrast    Result Date: 6/22/2023  EXAM: CT CHEST/ABDOMEN/PELVIS W CONTRAST LOCATION: [facility] DATE: 6/22/2023 INDICATION: new intracranial mass x2, eval primary mass tumor COMPARISON: None. TECHNIQUE: CT  scan of the chest, abdomen, and pelvis was performed following injection of IV contrast. Multiplanar reformats were obtained. Dose reduction techniques were used. CONTRAST: isovue 370 67ml FINDINGS: LUNGS AND PLEURA: Moderate upper lobe predominant centrilobular emphysema. There is a spiculated nodule in the right lower lobe measuring 1.5 x 1.2 cm (series 4 image 144) as well as a smaller similar-appearing nodule in the lingula measuring 1.5 x 1.0 cm (series 3 image 143). Scattered smaller pulmonary nodules, including 4 mm right middle lobe nodule (series 4 image 142). Scattered calcified granulomas noted. No pleural effusion. MEDIASTINUM/AXILLAE: No suspicious lymphadenopathy. CORONARY ARTERY CALCIFICATION: None. HEPATOBILIARY: Well-circumscribed hypodense lesion in the superior right hepatic lobe measuring up to 1.2 cm, density greater than simple fluid (series 3 image 115). No additional focal lesions visualized. PANCREAS: Normal. SPLEEN: Normal. ADRENAL GLANDS: Significant thickening of the left adrenal gland with possible nodule in the medial limb measuring up to 1.6 cm (series 3 image 150). KIDNEYS/BLADDER: Left renal cyst, no specific follow-up recommended. No hydronephrosis. Excreted noted in the renal collecting system. Urinary bladder is unremarkable. BOWEL: Colonic diverticulosis without evidence of acute diverticulitis. No obstruction or inflammatory change. Large duodenal diverticulum noted. LYMPH NODES: Normal. VASCULATURE: Moderate calcified atherosclerosis. PELVIC ORGANS: Normal. MUSCULOSKELETAL: No acute bony abnormality or suspicious osseous lesions.     IMPRESSION: 1.  Spiculated pulmonary nodules in the right lower and left upper lobes, suspicious for primary lung neoplasm. 2.  Possible left adrenal nodule, metastasis is possible. 3.  Indeterminate 1.2 cm right hepatic lesion, could consider attention on follow-up imaging versus PET.    CTA Head Neck with Contrast    Result Date: 6/22/2023  EXAM:  CTA HEAD NECK W CONTRAST, CT HEAD PERFUSION W CONTRAST LOCATION: [facility] DATE/TIME: 6/22/2023 3:37 PM CDT INDICATION: Weakness. Stroke evaluation. 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 COMPARISON: CT head dated 12/02/2020 CONTRAST: 75 mL Isovue 370, 50 mL Isovue-370 TECHNIQUE: 1) Head and neck CT angiogram with IV contrast. Noncontrast head CT followed by axial helical CT images of the vessels obtained during the arterial phase of intravenous contrast administration. Axial helical 2D reconstructed images and multiplanar 3D MIP  reconstructed images of the vessels were were generated on a separate workstation by the technologist using source data obtained at the time of image acquisition and reviewed. Dose reduction techniques were used. All stenosis measurements made according  to NASCET criteria unless otherwise specified. 2) Cerebral CT perfusion with IV contrast. Postprocessing with RAPID software used for interpretation.  Dose reduction techniques were used. FINDINGS: NONCONTRAST HEAD CT: INTRACRANIAL CONTENTS: Development of intracranial lesions with a rim of hyperdensity in the left parietal lobe measuring 1.8 x 1.7 cm and right frontal lobe measuring 1.7 x 1.3 cm with surrounding edematous change suspicious for intracranial metastases.  No acute intraparenchymal hemorrhage. Chronic right parietal lobe infarct without CT evidence of acute infarct. Sequelae of mild chronic microangiopathy. Mild cerebral volume loss without hydrocephalus. No extra-axial fluid collections.  Patent basal cisterns. VISUALIZED ORBITS/SINUSES/MASTOIDS: The orbits are unremarkable. The visualized paranasal sinuses and temporal bone structures are well-aerated. BONES/SOFT TISSUES: The calvarium and skull base are unremarkable. HEAD CTA: ANTERIOR CIRCULATION: No stenosis/occlusion or aneurysm. 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 The anterior communicating  artery is patent. The posterior communicating arteries are patent. POSTERIOR CIRCULATION: No stenosis/occlusion, aneurysm, or high flow vascular malformation. Right dominant vertebrobasilar circulation. The basilar artery is unremarkable and gives rise to patent superior cerebellar and posterior cerebral arteries. DURAL VENOUS SINUSES: Patent. NECK CTA: RIGHT CAROTID: Normal course and persist caliber of the common carotid artery. Atherosclerotic disease at the carotid artery bifurcation without stenosis or dissection. LEFT CAROTID: Normal course and persist caliber of the common carotid artery. Atherosclerotic disease at the carotid artery bifurcation without stenosis or dissection. VERTEBRAL ARTERIES: Balance configuration without stenosis or dissection. AORTIC ARCH: Classic three-vessel arch. The origins of the great vessels are unremarkable without significant stenosis. NONVASCULAR STRUCTURES: There are no masses or enlarged lymph nodes in the neck. The submandibular, parotid, and thyroid glands are unremarkable. Multilevel degenerative changes without high-grade spinal canal stenosis or neural foraminal narrowing. No aggressive osseous lesions. Severe emphysema with partially visualized airspace opacity in the anterior left upper lobe, which warrants further evaluation with dedicated thoracic CT. CEREBRAL CT PERFUSION: There is a small volume of perfusion abnormalities seen surrounding the lesions, which is likely related to the vasogenic edema. No vascular perfusion abnormality is identified. RAPID ANALYSIS: CBF<30% volume: 6 mL Tmax>6sec: 3 mL Mismatch volume: -3 mL Mismatch ratio: 0.5     IMPRESSION: HEAD CT: 1. Development of intracranial lesions with a rim of hyperdensity involving the left parietal lobe and right frontal lobe with surrounding edematous change suspicious for intracranial metastases. These could be better evaluated with a dedicated brain MRI  with/without contrast. HEAD CTA: 1. Patent  intracranial vasculature. 2. 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 NECK CTA: 1. Unremarkable neck vasculature. 2. Severe emphysema with partially visualized airspace opacity in the anterior left upper lobe, which warrants further evaluation with dedicated thoracic CT. CEREBRAL CT PERFUSION: 1. Small volume of perfusion abnormality surrounding the presumed intracranial metastasis likely related to the edematous change. - - - - - - - - - - - - - - - - - - - - - - - - - - - - - - - - - - - - - - - - - - - - - - - - - - - - - - - - - - - - - - - - - - - - - - - - - - - - The results above were discussed with Dr. Ellsworth on 6/22/2023 3:54 PM CDT by Dr. Cleveland Topete. - - - - - - - - - - - - - - - - - - - - - - - - - - - - - - - - - - - - - - - - - - - - - - - - - - - - - - - - - - - - - - - - - - - - - - - - - - - -    CT Head Perfusion w Contrast - For Tier 2 Stroke    Result Date: 6/22/2023  EXAM: CTA HEAD NECK W CONTRAST, CT HEAD PERFUSION W CONTRAST LOCATION: [facility] DATE/TIME: 6/22/2023 3:37 PM CDT INDICATION: Weakness. Stroke evaluation. 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 COMPARISON: CT head dated 12/02/2020 CONTRAST: 75 mL Isovue 370, 50 mL Isovue-370 TECHNIQUE: 1) Head and neck CT angiogram with IV contrast. Noncontrast head CT followed by axial helical CT images of the vessels obtained during the arterial phase of intravenous contrast administration. Axial helical 2D reconstructed images and multiplanar 3D MIP  reconstructed images of the vessels were were generated on a separate workstation by the technologist using source data obtained at the time of image acquisition and reviewed. Dose reduction techniques were used. All stenosis measurements made according  to NASCET criteria unless otherwise specified. 2) Cerebral CT perfusion with IV contrast. Postprocessing with RAPID software used for interpretation.  Dose reduction  techniques were used. FINDINGS: NONCONTRAST HEAD CT: INTRACRANIAL CONTENTS: Development of intracranial lesions with a rim of hyperdensity in the left parietal lobe measuring 1.8 x 1.7 cm and right frontal lobe measuring 1.7 x 1.3 cm with surrounding edematous change suspicious for intracranial metastases.  No acute intraparenchymal hemorrhage. Chronic right parietal lobe infarct without CT evidence of acute infarct. Sequelae of mild chronic microangiopathy. Mild cerebral volume loss without hydrocephalus. No extra-axial fluid collections.  Patent basal cisterns. VISUALIZED ORBITS/SINUSES/MASTOIDS: The orbits are unremarkable. The visualized paranasal sinuses and temporal bone structures are well-aerated. BONES/SOFT TISSUES: The calvarium and skull base are unremarkable. HEAD CTA: ANTERIOR CIRCULATION: No stenosis/occlusion or aneurysm. 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 The anterior communicating artery is patent. The posterior communicating arteries are patent. POSTERIOR CIRCULATION: No stenosis/occlusion, aneurysm, or high flow vascular malformation. Right dominant vertebrobasilar circulation. The basilar artery is unremarkable and gives rise to patent superior cerebellar and posterior cerebral arteries. DURAL VENOUS SINUSES: Patent. NECK CTA: RIGHT CAROTID: Normal course and persist caliber of the common carotid artery. Atherosclerotic disease at the carotid artery bifurcation without stenosis or dissection. LEFT CAROTID: Normal course and persist caliber of the common carotid artery. Atherosclerotic disease at the carotid artery bifurcation without stenosis or dissection. VERTEBRAL ARTERIES: Balance configuration without stenosis or dissection. AORTIC ARCH: Classic three-vessel arch. The origins of the great vessels are unremarkable without significant stenosis. NONVASCULAR STRUCTURES: There are no masses or enlarged lymph nodes in the neck. The submandibular, parotid, and thyroid  glands are unremarkable. Multilevel degenerative changes without high-grade spinal canal stenosis or neural foraminal narrowing. No aggressive osseous lesions. Severe emphysema with partially visualized airspace opacity in the anterior left upper lobe, which warrants further evaluation with dedicated thoracic CT. CEREBRAL CT PERFUSION: There is a small volume of perfusion abnormalities seen surrounding the lesions, which is likely related to the vasogenic edema. No vascular perfusion abnormality is identified. RAPID ANALYSIS: CBF<30% volume: 6 mL Tmax>6sec: 3 mL Mismatch volume: -3 mL Mismatch ratio: 0.5     IMPRESSION: HEAD CT: 1. Development of intracranial lesions with a rim of hyperdensity involving the left parietal lobe and right frontal lobe with surrounding edematous change suspicious for intracranial metastases. These could be better evaluated with a dedicated brain MRI  with/without contrast. HEAD CTA: 1. Patent intracranial vasculature. 2. 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 NECK CTA: 1. Unremarkable neck vasculature. 2. Severe emphysema with partially visualized airspace opacity in the anterior left upper lobe, which warrants further evaluation with dedicated thoracic CT. CEREBRAL CT PERFUSION: 1. Small volume of perfusion abnormality surrounding the presumed intracranial metastasis likely related to the edematous change. - - - - - - - - - - - - - - - - - - - - - - - - - - - - - - - - - - - - - - - - - - - - - - - - - - - - - - - - - - - - - - - - - - - - - - - - - - - - The results above were discussed with Dr. Ellsworth on 6/22/2023 3:54 PM CDT by Dr. Cleveland Topete. - - - - - - - - - - - - - - - - - - - - - - - - - - - - - - - - - - - - - - - - - - - - - - - - - - - - - - - - - - - - - - - - - - - - - - - - - - - -      MRI brain and CT chest images were personally reviewed.    This note has been dictated using voice recognition software. Any grammatical or context distortions  are unintentional and inherent to the software      Signed by: Edd Winchester MD, MD

## 2023-06-26 PROBLEM — C79.31 MALIGNANT NEOPLASM METASTATIC TO BRAIN (H): Status: ACTIVE | Noted: 2023-01-01

## 2023-06-26 NOTE — TELEPHONE ENCOUNTER
Daughter Katlyn is calling on behalf of Jordyn.    Jordyn has decided to not move forward with treatment and wanted to cancel all of her appointments and didn't want to schedule a follow up    Marcy ODEN  [facility]   [facility]  [phone] / [phone]

## 2023-06-27 NOTE — TELEPHONE ENCOUNTER
Left a message with  Jordyn to see how she was doing. Since her daughter decided on no cancer treatment and her steroid effect will start to wear off, her confusion, visual field defect and expressive aphasia are likely to get worse.     I am concerned that her voice was not heard with the rushed discharge and the appointment cancellation both driven by daughter Katlyn. Wanted to verify that plan is what oJrdyn wants.     Left message and call back number

## 2023-06-28 NOTE — TELEPHONE ENCOUNTER
I called and left a message with this patient to hopefully schedule a hospital follow-up with her.  If she calls back, please send me a message and I can look at my schedule to get something that will work for both of us.    Thanks    EB

## 2023-06-29 NOTE — UTILIZATION REVIEW
Medicare Post Discharge Review:  Admission Status; Secondary Review Determination   Under the authority of the Utilization Management Committee, the utilization review process indicated a secondary review on Jordyn Miller. The review outcome is based on review of the medical records, discussions with staff, and applying clinical experience noted on the date of the review.   (x) Inpatient Status Appropriate - This patient's medical care is consistent with medical management for inpatient care and reasonable inpatient medical practice.     RATIONALE FOR DETERMINATION  Jordyn Miller is an 82 yr old female with HTN who presented with slurred speech and confusion.  ED eval with findings on CT of suspected brain mets.  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 Moderate adjacent vasogenic edema with localized mass effect.  At time of admission a > 2 MN stay was expected due to the presenting symptoms and findings.  She was seen by Palliative Care, Neurosurgery, Neurology, Oncology and Radiation Oncology.  She improved faster than expected on the Keppra and Dexamethasone and plan was set up for quick outpatient follow-up.   At the time of admission with the information available to the attending physician more than 2 nights Hospital complex care was anticipated, based on patient risk of adverse outcome if treated as outpatient and complex care required. Inpatient admission is appropriate based on the Medicare guidelines.   The information on this document is developed by the utilization review team in order for the business office to ensure compliance. This only denotes the appropriateness of proper admission status and does not reflect the quality of care rendered.   The definitions of  Inpatient Status and Observation Status used in making the determination above are those provided in the CMS Coverage Manual, Chapter 1 and Chapter 6, section 70.4.   Sincerely,   Nina Joiner MD  Utilization Review  Physician Advisor  [facility]

## 2023-07-17 ENCOUNTER — TELEPHONE (OUTPATIENT)
Dept: FAMILY MEDICINE | Facility: CLINIC | Age: 83
End: 2023-07-17
Payer: MEDICARE

## 2023-07-17 NOTE — TELEPHONE ENCOUNTER
Call placed to [facility]  Answering service answered    Relayed death certificate was signed and if Duc has questions to please reach out to the care team at [phone]    Kristofer Restrepo RN

## 2023-07-17 NOTE — TELEPHONE ENCOUNTER
Patient passed away and has been at  home a couple days. Duc requesting death cert signed, was sent to [facility] email.    Thank you,    Dara York, BERTHA Franco